# Patient Record
Sex: FEMALE | Race: WHITE | Employment: FULL TIME | URBAN - METROPOLITAN AREA
[De-identification: names, ages, dates, MRNs, and addresses within clinical notes are randomized per-mention and may not be internally consistent; named-entity substitution may affect disease eponyms.]

---

## 2017-07-18 ENCOUNTER — TELEPHONE (OUTPATIENT)
Dept: ENDOCRINOLOGY | Age: 27
End: 2017-07-18

## 2017-07-18 DIAGNOSIS — E10.69 TYPE I DIABETES MELLITUS WITH HYPEROSMOLAR COMA (HCC): ICD-10-CM

## 2017-07-18 DIAGNOSIS — E10.65 TYPE I DIABETES MELLITUS WITH HYPEROSMOLAR COMA (HCC): ICD-10-CM

## 2017-07-18 DIAGNOSIS — E87.0 TYPE I DIABETES MELLITUS WITH HYPEROSMOLAR COMA (HCC): ICD-10-CM

## 2017-07-18 NOTE — TELEPHONE ENCOUNTER
Patient was hoping you can call in her insulin to [facility] since her appointment is scheduled for 10/2017.   Thank you

## 2017-07-19 RX ORDER — INSULIN ASPART 100 [IU]/ML
INJECTION, SOLUTION INTRAVENOUS; SUBCUTANEOUS
Qty: 9 VIAL | Refills: 2 | Status: SHIPPED | OUTPATIENT
Start: 2017-07-19 | End: 2017-10-02 | Stop reason: SDUPTHER

## 2017-10-02 DIAGNOSIS — E10.65 TYPE I DIABETES MELLITUS WITH HYPEROSMOLAR COMA (HCC): ICD-10-CM

## 2017-10-02 DIAGNOSIS — E10.69 TYPE I DIABETES MELLITUS WITH HYPEROSMOLAR COMA (HCC): ICD-10-CM

## 2017-10-02 DIAGNOSIS — E87.0 TYPE I DIABETES MELLITUS WITH HYPEROSMOLAR COMA (HCC): ICD-10-CM

## 2017-10-02 RX ORDER — INSULIN ASPART 100 [IU]/ML
INJECTION, SOLUTION INTRAVENOUS; SUBCUTANEOUS
Qty: 9 VIAL | Refills: 0 | Status: SHIPPED | OUTPATIENT
Start: 2017-10-02 | End: 2017-10-10 | Stop reason: ALTCHOICE

## 2017-10-04 DIAGNOSIS — E10.65 HYPERGLYCEMIA DUE TO TYPE 1 DIABETES MELLITUS (HCC): Primary | ICD-10-CM

## 2017-10-04 RX ORDER — INSULIN LISPRO 100 [IU]/ML
INJECTION, SOLUTION INTRAVENOUS; SUBCUTANEOUS
Qty: 10 ML | Refills: 0 | Status: SHIPPED | OUTPATIENT
Start: 2017-10-04 | End: 2018-08-27 | Stop reason: ALTCHOICE

## 2017-10-10 ENCOUNTER — OFFICE VISIT (OUTPATIENT)
Dept: ENDOCRINOLOGY | Age: 27
End: 2017-10-10

## 2017-10-10 VITALS
DIASTOLIC BLOOD PRESSURE: 76 MMHG | TEMPERATURE: 98.4 F | WEIGHT: 139.3 LBS | OXYGEN SATURATION: 97 % | HEART RATE: 77 BPM | BODY MASS INDEX: 24.68 KG/M2 | RESPIRATION RATE: 18 BRPM | SYSTOLIC BLOOD PRESSURE: 112 MMHG | HEIGHT: 63 IN

## 2017-10-10 DIAGNOSIS — E16.2 HYPOGLYCEMIA: ICD-10-CM

## 2017-10-10 DIAGNOSIS — Z46.81 INSULIN PUMP TITRATION: ICD-10-CM

## 2017-10-10 DIAGNOSIS — E10.65 HYPERGLYCEMIA DUE TO TYPE 1 DIABETES MELLITUS (HCC): Primary | ICD-10-CM

## 2017-10-10 LAB
GLUCOSE POC: 360 MG/DL
HBA1C MFR BLD HPLC: 9.6 %

## 2017-10-10 RX ORDER — INSULIN ASPART 100 [IU]/ML
INJECTION, SOLUTION INTRAVENOUS; SUBCUTANEOUS
Qty: 2 VIAL | Refills: 0 | Status: SHIPPED | COMMUNITY
Start: 2017-10-10 | End: 2018-08-27 | Stop reason: ALTCHOICE

## 2017-10-10 NOTE — PROGRESS NOTES
HISTORY OF PRESENT ILLNESS     Fredi Kay is a 24 y.o. female. HPI   Patient is here for follow up for Type 1 diabetes mellitus after sept 2016       She could not f/u with me because of lack of insurance     She is requesting change to humalog first   She is running out of insulin     She is non-compliant with bolusing and checking sugars   Trying to work 2 jobs      She is on the pump medtronic   Has not used bolus doses at all     Log has no recordings   Does not use bolus or carbs   Had no low sugars either     She is no more looking stubborn and she understands the importance of glycemic control but not making any effort at all         Review of Systems   Constitutional: lost appetite    HENT: Negative. Eyes: Negative for pain and redness. Respiratory: Negative. Cardiovascular: Negative for chest pain, palpitations and leg swelling. Gastrointestinal: Negative. Negative for constipation. Genitourinary: Negative. Musculoskeletal: Negative for myalgias. Skin: Negative. Neurological: Negative. Endo/Heme/Allergies: Negative. Psychiatric/Behavioral: Negative for depression and memory loss. The patient does not have insomnia. Physical Exam   Constitutional: She is oriented to person, place, and time. She appears well-developed and well-nourished. HENT:   Head: Normocephalic. Eyes: Conjunctivae and extraocular motions are normal. Pupils are equal, round, and reactive to light. Neck: Normal range of motion. Neck supple. No JVD present. No tracheal deviation present. No thyromegaly present. Cardiovascular: Normal rate, regular rhythm and normal heart sounds. No murmur heard. Pulmonary/Chest: Breath sounds normal.   Abdominal: Soft. Bowel sounds are normal.   Musculoskeletal: Normal range of motion. Lymphadenopathy:   She has no cervical adenopathy. Neurological: She is alert and oriented to person, place, and time. She has normal reflexes. Skin: Skin is warm.  A small 8 mm size fleshy raised nodule on skin  Psychiatric: She has a normal mood and affect. ASSESSMENT and PLAN       1. Type 1DM, poorly controlled , on insulin pump :  A1C  Is  9.6 %    From today by finger stick compared    9.8 %    From   2016  Compared to  9.9 %   From [facility]   10.1 %     From 2015 compared to   9.1 %   From oct 2014 compared to    9.6 % from 2014 compared to  8.4 %  From 2013   Compared to 8.8 % from aug 2013 comapred to 9.3 % from 2013 ; And was over 11 % From 2012 compared to 9.5 % today 2013       Uncontrolled for the past 22 years   No complications set in yet gladly       She has no checks done and she has not been changing the site often either   She is requiring 40 units a day ( only basals) atleast   Changing to humalog  By formulary , but by  Her deductible regular insulin is affordable    no boluses are being used      She has  brought  meter or log and she has not checked her sugars at all  She smiles it off when insisted on control   She does maintain low carb diet and she is able to manage     TO key in carbs and use bolus wizard  Recommending cgms, she refused  For cost reasons   She cannot afford newer versions on insulin pump either     Again, she is counseled about glycemic control importance as she can face complications sooner as she is diagnosed with Type 1 as a baby - 18 months   3000 $ deductible   Strictly advised on using bolus wizard for every food item. Patient is educated about the importance of glycemic control to prevent progression of complications. Patient is advised about checking blood sugars 3 times a day at least and maintaining log book. She is told to go for precision xtra machine and test strips   She has contour next strips, which she has never used them and must  Have got        2. hypoglycemia : Prescribed Glucagon.      3. No microalb noted in urine, BP well controlled   Discussed starting on lisinopril 2.5 mg a day for renal protective action       Had an eye check up in May 2016 ,  and had no diabetes per pt , referred her to Dr. Clara Lugo     Thyroid screen before next visit     > 50 % of time is spent on counseling

## 2017-10-10 NOTE — PROGRESS NOTES
Chief Complaint   Patient presents with    Diabetes     1. Have you been to the ER, urgent care clinic since your last visit? Hospitalized since your last visit? No    2. Have you seen or consulted any other health care providers outside of the [address] [place] [address] since your last visit? Include any pap smears or colon screening.  No     Pt do not have meter    No foot exam  No eye exam    Wt Readings from Last 3 Encounters:   10/10/17 139 lb 4.8 oz (63.2 kg)   09/19/16 136 lb (61.7 kg)   02/09/16 138 lb (62.6 kg)     Temp Readings from Last 3 Encounters:   10/10/17 98.4 °F (36.9 °C) (Oral)   09/19/16 97.3 °F (36.3 °C) (Oral)   02/09/16 (!) 100.6 °F (38.1 °C) (Oral)     BP Readings from Last 3 Encounters:   10/10/17 112/76   09/19/16 117/76   02/09/16 113/84     Pulse Readings from Last 3 Encounters:   10/10/17 77   09/19/16 82   02/09/16 (!) 105

## 2017-10-10 NOTE — MR AVS SNAPSHOT
Visit Information Date & Time Provider Department Dept. Phone Encounter #  
 10/10/2017  3:30 PM Lashaun Edgar MD [facility] [phone] [id number] Upcoming Health Maintenance Date Due  
 LIPID PANEL Q1 1992 FOOT EXAM Q1 3/4/2002 EYE EXAM RETINAL OR DILATED Q1 3/4/2002 HPV AGE 9Y-26Y (1 of 3 - Female 3 Dose Series) 3/4/2003 Pneumococcal 19-64 Medium Risk (1 of 1 - PPSV23) 3/4/2011 DTaP/Tdap/Td series (1 - Tdap) 3/4/2013 PAP AKA CERVICAL CYTOLOGY 3/4/2013 MICROALBUMIN Q1 4/29/2016 HEMOGLOBIN A1C Q6M 3/19/2017 INFLUENZA AGE 9 TO ADULT 8/1/2017 Allergies as of 10/10/2017  Review Complete On: 10/10/2017 By: Lashaun Edgar MD  
 No Known Allergies Current Immunizations  Never Reviewed No immunizations on file. Not reviewed this visit You Were Diagnosed With   
  
 Codes Comments [facility]gly[facility] due to [facility]    -  Primary ICD-10-CM: E10.65 ICD-9-CM: 250.01 Insulin pump titration     ICD-10-CM: Z46.81 
ICD-9-CM: V53.91 Hypoglycemia     ICD-10-CM: E16.2 ICD-9-CM: 251.2 Vitals BP Pulse Temp Resp Height(growth percentile) Weight(growth percentile) 112/76 (BP 1 Location: Right arm, BP Patient Position: Sitting) 77 98.4 °F (36.9 °C) (Oral) 18 5' 3\" (1.6 m) 139 lb 4.8 oz (63.2 kg) LMP SpO2 BMI OB Status Smoking Status 09/22/2017 97% 24.68 kg/m2 Having regular periods Never Smoker BMI and BSA Data Body Mass Index Body Surface Area  
 24.68 kg/m 2 1.68 m 2 Preferred Pharmacy Pharmacy Name Phone [address], [address] [place] [address] [place] [phone] Your Updated Medication List  
  
   
This list is accurate as of: 10/10/17  4:37 PM.  Always use your most recent med list.  
  
  
  
  
 Blood-Glucose Meter monitoring kit Commonly known as:  FREESTYLE LITE METER To test daily Dx Code 250.00 glucose blood VI test strips strip Commonly known as:  FREESTYLE LITE STRIPS Test up to 3 times a day Dx Code E10.65  
  
 insulin lispro 100 unit/mL injection Commonly known as:  HUMALOG On insulin pump, requiring 70 units per day Stop Novolog  
  
 insulin regular 100 unit/mL injection Commonly known as:  NOVOLIN R, HUMULIN R  
60 units / day in pump Lancets Misc Commonly known as:  FREESTYLE LANCETS Test up to 3 times daily. Dx code C13.89 Prescriptions Printed Refills  
 insulin regular (NOVOLIN R, HUMULIN R) 100 unit/mL injection 6 Si units / day in pump Class: Print We Performed the Following AMB POC GLUCOSE BLOOD, BY GLUCOSE MONITORING DEVICE [99186 CPT(R)] AMB POC HEMOGLOBIN A1C [45512 CPT(R)] MICROALBUMIN, UR, RAND W/ MICROALBUMIN/CREA RATIO [id number] CPT(R)] Patient Instructions   
 
 
humalog   Preferred , needs at least  60 units a day   2 vials a month ,  
 
 
 
Reli-on  Insulin regular can be used instead of any quick acting insulins 
reli-on meter and strips Do not skip meals Do not eat in between meals Reduce carbs- pasta, rice, potatoes, bread Do not drink juices or sodas Donot eat peanut butter Do not eat sugar free cookies and cakes Do not eat peaches, grapes, pine apples, and oranges and tangerines Introducing [facility] & HEALTH SERVICES! Andreina Mccoy introduces [employer] patient portal. Now you can access parts of your medical record, email your doctor's office, and request medication refills online. 1. In your internet browser, go to [email or web address] 2. Click on the First Time User? Click Here link in the Sign In box. You will see the New Member Sign Up page. 3. Enter your [employer] Access Code exactly as it appears below. You will not need to use this code after youve completed the sign-up process.  If you do not sign up before the expiration date, you must request a new code. · [employer] Access Code: [email or web address] Expires: 1/8/2018  4:25 PM 
 
4. Enter the last four digits of your Social Security Number (xxxx) and Date of Birth (mm/dd/yyyy) as indicated and click Submit. You will be taken to the next sign-up page. 5. Create a [employer] ID. This will be your [employer] login ID and cannot be changed, so think of one that is secure and easy to remember. 6. Create a [employer] password. You can change your password at any time. 7. Enter your Password Reset Question and Answer. This can be used at a later time if you forget your password. 8. Enter your e-mail address. You will receive e-mail notification when new information is available in [address] 9. Click Sign Up. You can now view and download portions of your medical record. 10. Click the Download Summary menu link to download a portable copy of your medical information. If you have questions, please visit the Frequently Asked Questions section of the [employer] website. Remember, [employer] is NOT to be used for urgent needs. For medical emergencies, dial 911. Now available from your iPhone and Android! Please provide this summary of care documentation to your next provider. Your primary care clinician is listed as Clarita Andersen. If you have any questions after today's visit, please call [phone].

## 2017-10-10 NOTE — PATIENT INSTRUCTIONS
humalog   Preferred , needs at least  60 units a day   2 vials a month ,         Reli-on  Insulin regular can be used instead of any quick acting insulins  reli-on meter and strips                     Do not skip meals  Do not eat in between meals    Reduce carbs- pasta, rice, potatoes, bread   Do not drink juices or sodas  Donot eat peanut butter     Do not eat sugar free cookies and cakes   Do not eat peaches, grapes, pine apples, and oranges and tangerines

## 2017-10-11 LAB
ALBUMIN/CREAT UR: ABNORMAL MG/G CREAT (ref 0–30)
CREAT UR-MCNC: 41.1 MG/DL
MICROALBUMIN UR-MCNC: <3 UG/ML

## 2017-12-26 ENCOUNTER — TELEPHONE (OUTPATIENT)
Dept: ENDOCRINOLOGY | Age: 27
End: 2017-12-26

## 2018-08-27 RX ORDER — HUMAN INSULIN 100 [IU]/ML
INJECTION, SOLUTION SUBCUTANEOUS
Qty: 20 ML | Refills: 6 | Status: SHIPPED | OUTPATIENT
Start: 2018-08-27 | End: 2019-06-08 | Stop reason: SDUPTHER

## 2018-10-09 ENCOUNTER — OFFICE VISIT (OUTPATIENT)
Dept: ENDOCRINOLOGY | Age: 28
End: 2018-10-09

## 2018-10-09 VITALS
TEMPERATURE: 96.7 F | HEIGHT: 63 IN | WEIGHT: 134.5 LBS | OXYGEN SATURATION: 100 % | DIASTOLIC BLOOD PRESSURE: 77 MMHG | BODY MASS INDEX: 23.83 KG/M2 | SYSTOLIC BLOOD PRESSURE: 129 MMHG | RESPIRATION RATE: 18 BRPM | HEART RATE: 85 BPM

## 2018-10-09 DIAGNOSIS — Z46.81 INSULIN PUMP TITRATION: ICD-10-CM

## 2018-10-09 DIAGNOSIS — E10.65 HYPERGLYCEMIA DUE TO TYPE 1 DIABETES MELLITUS (HCC): Primary | ICD-10-CM

## 2018-10-09 DIAGNOSIS — Z79.4 ENCOUNTER FOR LONG-TERM (CURRENT) USE OF INSULIN (HCC): ICD-10-CM

## 2018-10-09 LAB
GLUCOSE POC: 379 MG/DL
HBA1C MFR BLD HPLC: 10.2 %

## 2018-10-09 NOTE — PROGRESS NOTES
HISTORY OF PRESENT ILLNESS Jostin Jeong is a 24 y.o. female. HPI Patient is here for follow up for Type 1 diabetes mellitus after oct 2017 She could not f/u with me because of lack of insurance She is requesting change to humalog first  
She is running out of insulin She is non-compliant with bolusing and checking sugars Trying to work 2 jobs She is on the pump medtronic Has not used bolus doses at all Log has no recordings Does not use bolus or carbs Had no low sugars either She is no more looking stubborn and she understands the importance of glycemic control but not making any effort at all Review of Systems Constitutional: lost appetite HENT: Negative. Eyes: Negative for pain and redness. Respiratory: Negative. Cardiovascular: Negative for chest pain, palpitations and leg swelling. Gastrointestinal: Negative. Negative for constipation. Genitourinary: Negative. Musculoskeletal: Negative for myalgias. Skin: Negative. Neurological: Negative. Endo/Heme/Allergies: Negative. Psychiatric/Behavioral: Negative for depression and memory loss. The patient does not have insomnia. Physical Exam  
Constitutional: She is oriented to person, place, and time. She appears well-developed and well-nourished. HENT:  
Head: Normocephalic. Eyes: Conjunctivae and extraocular motions are normal. Pupils are equal, round, and reactive to light. Neck: Normal range of motion. Neck supple. No JVD present. No tracheal deviation present. No thyromegaly present. Cardiovascular: Normal rate, regular rhythm and normal heart sounds. No murmur heard. Pulmonary/Chest: Breath sounds normal.  
Abdominal: Soft. Bowel sounds are normal.  
Musculoskeletal: Normal range of motion. Lymphadenopathy:  
She has no cervical adenopathy. Neurological: She is alert and oriented to person, place, and time. She has normal reflexes. Skin: Skin is warm. A small 8 mm size fleshy raised nodule on skin Psychiatric: She has a normal mood and affect. ASSESSMENT and PLAN 1. Type 1DM, poorly controlled , on insulin pump :  A1C  Is   10.2 %       From today by finger stick compared to    9.6 %    From today by finger stick compared    9.8 %    From   2016  Compared to  9.9 %   From [facility]   10.1 %     From 2015 compared to   9.1 %   From oct 2014 compared to 
  9.6 % from 2014 compared to  8.4 %  From 2013   Compared to 8.8 % from aug 2013 comapred to 9.3 % from 2013 ; And was over 11 % From 2012 compared to 9.5 % today 2013 Uncontrolled for the past 22 years No complications set in yet gladly She has no checks done and she has not been cARBing  either She is requiring 40 units a day ( only basals) atleast  
Changing to humalog  By formulary , but by  Her deductible regular insulin is affordable  
 no boluses are being used She has  brought  meter or log and she has not checked her sugars at all She smiles it off when insisted on control She does maintain low carb diet and she is able to manage TO key in carbs and use bolus wizard Recommending cgms, she refused  For cost reasons She cannot afford newer versions on insulin pump either Again, she is counseled about glycemic control importance as she can face complications sooner as she is diagnosed with Type 1 as a baby - 18 months 3000 $ deductible Strictly advised on using bolus wizard for every food item. Patient is educated about the importance of glycemic control to prevent progression of complications. Patient is advised about checking blood sugars 3 times a day at least and maintaining log book. She is told to go for precision xtra machine and test strips She has contour next strips, which she has never used them and must  Have got  2. hypoglycemia : Prescribed Glucagon. 3. No microalb noted in urine, BP well controlled Discussed starting on lisinopril 2.5 mg a day for renal protective action Had an eye check up in May 2016 ,  and had no diabetes per pt , referred her to Dr. Shanel Rodriguez Thyroid screen before next visit She has a huge deductible  And so any thing medical , unfortunately she has to think about deductible  Several times including eye visits  
 
 
> 50 % of time is spent on counseling Patient voiced understanding her plan of care

## 2018-10-09 NOTE — PATIENT INSTRUCTIONS
-------------------------------------------------------------------------------------------- Refills    -    please call your pharmacy and have them send us a refill request 
 
Results  -  allow up to a week for lab results to be processed and reviewed. Phone calls  -  Allow upto 24 hrs. for non-urgent calls to be retained Prior authorization - It may take up to 4 weeks to process, depending on your insurance Forms  -  FMLA, DMV, patient assistance, etc. will take up to 2 weeks to process Cancellations - please notify the office in advance if you cannot keep your appointment Samples  - will only be dispensed at visits as supply is limited If you are having a medical emergency call 911 
 
-------------------------------------------------------------------------------------------- 
 
 
humalog   Preferred , needs at least  60 units a day   2 vials a month ,  
 
 
 
Reli-on  Insulin regular can be used instead of any quick acting insulins like humalog ( pt is using novolin regular insulin ) 
reli-on meter and strips Do not skip meals Do not eat in between meals Reduce carbs- pasta, rice, potatoes, bread Do not drink juices or sodas Donot eat peanut butter Do not eat sugar free cookies and cakes Do not eat peaches, grapes, pine apples, and oranges and tangerines

## 2018-10-09 NOTE — PROGRESS NOTES
1. Have you been to the ER, urgent care clinic since your last visit? Hospitalized since your last visit? No 
 
2. Have you seen or consulted any other health care providers outside of the [address] [place] [address] since your last visit? Include any pap smears or colon screening. No  
 
Wt Readings from Last 3 Encounters:  
10/09/18 134 lb 8 oz (61 kg) 10/10/17 139 lb 4.8 oz (63.2 kg) 09/19/16 136 lb (61.7 kg) Temp Readings from Last 3 Encounters:  
10/09/18 96.7 °F (35.9 °C) (Oral) 10/10/17 98.4 °F (36.9 °C) (Oral) 09/19/16 97.3 °F (36.3 °C) (Oral) BP Readings from Last 3 Encounters:  
10/09/18 129/77  
10/10/17 112/76  
09/19/16 117/76 Pulse Readings from Last 3 Encounters:  
10/09/18 85  
10/10/17 77  
09/19/16 82 Lab Results Component Value Date/Time Hemoglobin A1c (POC) 9.6 10/10/2017 03:55 PM  
 Hemoglobin A1c, External 9.9 01/12/2016 Patient has pump today.  
Patient states she will schedule eye exam.

## 2018-10-09 NOTE — MR AVS SNAPSHOT
[address] [facility] [address] 
[phone] Patient: James Bradshaw MRN: [id number] JEISON:[birth year] Visit Information Date & Time Provider Department Dept. Phone Encounter #  
 10/9/2018  3:30 PM Shay Mckeon MD [facility] [phone] [id number] Follow-up Instructions Return in about 6 months (around 4/9/2019). Upcoming Health Maintenance Date Due  
 LIPID PANEL Q1 1992 FOOT EXAM Q1 3/4/2002 EYE EXAM RETINAL OR DILATED Q1 3/4/2002 HPV Age 9Y-34Y (1 of 3 - Female 3 Dose Series) 3/4/2003 Pneumococcal 19-64 Medium Risk (1 of 1 - PPSV23) 3/4/2011 DTaP/Tdap/Td series (1 - Tdap) 3/4/2013 PAP AKA CERVICAL CYTOLOGY 3/4/2013 HEMOGLOBIN A1C Q6M 4/10/2018 Influenza Age 5 to Adult 8/1/2018 MICROALBUMIN Q1 10/10/2018 Allergies as of 10/9/2018  Review Complete On: 10/9/2018 By: Shay Mckeon MD  
 No Known Allergies Current Immunizations  Never Reviewed No immunizations on file. Not reviewed this visit You Were Diagnosed With   
  
 Codes Comments Hyperglycemia due to type 1 diabetes mellitus ([facility]    -  Primary ICD-10-CM: E10.65 ICD-9-CM: 250.01 Encounter for long-term (current) use of insulin ([facility]     ICD-10-CM: Z79.4 ICD-9-CM: V58.67 Vitals BP Pulse Temp Resp Height(growth percentile) Weight(growth percentile) 129/77 (BP 1 Location: Right arm, BP Patient Position: Sitting) 85 96.7 °F (35.9 °C) (Oral) 18 5' 3\" (1.6 m) 134 lb 8 oz (61 kg) LMP SpO2 BMI OB Status Smoking Status 09/18/2018 100% 23.83 kg/m2 Having regular periods Never Smoker BMI and BSA Data Body Mass Index Body Surface Area  
 23.83 kg/m 2 1.65 m 2 [facility] [address] [facility] [address] Your Updated Medication List  
  
   
 This list is accurate as of 10/9/18  4:04 PM.  Always use your most recent med list.  
  
  
  
  
 Blood-Glucose Meter monitoring kit Commonly known as:  FREESTYLE LITE METER To test daily Dx Code 250.00  
  
 flash glucose scanning reader Misc Commonly known as:  FREESTYLE STACEY 10 DAY READER Use as directed  
  
 flash glucose sensor Kit Commonly known as:  [address] [place] [address] Once every 10 days  
  
 glucose blood VI test strips strip Commonly known as:  FREESTYLE LITE STRIPS Test up to 3 times a day Dx Code E10.65 Lancets Misc Commonly known as:  FREESTYLE LANCETS Test up to 3 times daily. Dx code J49.50 NovoLIN R Regular U-100 Insuln 100 unit/mL injection Generic drug:  insulin regular INJECT 60 UNITS ONCE DAILY IN  PUMP Prescriptions Printed Refills  
 flash glucose scanning reader (FREESTYLE STACEY 10 DAY READER) misc 0 Sig: Use as directed Class: Print  
 flash glucose sensor (FREESTYLE STACEY 10 DAY SENSOR) kit 6 Sig: Once every 10 days Class: Print We Performed the Following AMB POC GLUCOSE BLOOD, BY GLUCOSE MONITORING DEVICE [05873 CPT(R)] AMB POC HEMOGLOBIN A1C [48571 CPT(R)] METABOLIC PANEL, BASIC [07420 CPT(R)] MICROALBUMIN, UR, RAND W/ MICROALB/CREAT RATIO [id number] CPT(R)] REFERRAL TO OPHTHALMOLOGY [REF57 Custom] Comments: DDEE  
 TSH 3RD GENERATION [06628 CPT(R)] Follow-up Instructions Return in about 6 months (around 4/9/2019). Referral Information Referral ID Referred By Referred To  
  
 [id number] Yakelin Ralph Not Available Visits Status Start Date End Date 1 New Request 10/9/18 10/9/19 If your referral has a status of pending review or denied, additional information will be sent to support the outcome of this decision.   
  
Patient Instructions   
-------------------------------------------------------------------------------------------- 
 
 Refills    -    please call your pharmacy and have them send us a refill request 
 
Results  -  allow up to a week for lab results to be processed and reviewed. Phone calls  -  Allow upto 24 hrs. for non-urgent calls to be retained Prior authorization - It may take up to 4 weeks to process, depending on your insurance Forms  -  FMLA, DMV, patient assistance, etc. will take up to 2 weeks to process Cancellations - please notify the office in advance if you cannot keep your appointment Samples  - will only be dispensed at visits as supply is limited If you are having a medical emergency call 911 
 
-------------------------------------------------------------------------------------------- 
 
 
humalog   Preferred , needs at least  60 units a day   2 vials a month ,  
 
 
 
Reli-on  Insulin regular can be used instead of any quick acting insulins like humalog ( pt is using novolin regular insulin ) 
reli-on meter and strips Do not skip meals Do not eat in between meals Reduce carbs- pasta, rice, potatoes, bread Do not drink juices or sodas Donot eat peanut butter Do not eat sugar free cookies and cakes Do not eat peaches, grapes, pine apples, and oranges and tangerines Introducing [facility] & HEALTH SERVICES! Dear Stacey: 
Thank you for requesting a [employer] Web account. Our records indicate that you already have an active Possible Web account. You can access your account anytime at [email or web address] Did you know that you can access your hospital and ER discharge instructions at any time in [employer]? You can also review all of your test results from your hospital stay or ER visit. Additional Information If you have questions, please visit the Frequently Asked Questions section of the [employer] website at [email or web address] Remember, [employer] is NOT to be used for urgent needs. For medical emergencies, dial 911. Now available from your iPhone and Android! Please provide this summary of care documentation to your next provider. Your primary care clinician is listed as Annmarie Dang. If you have any questions after today's visit, please call [phone].

## 2018-10-10 LAB
ALBUMIN/CREAT UR: <7 MG/G CREAT (ref 0–30)
BUN SERPL-MCNC: 13 MG/DL (ref 6–20)
BUN/CREAT SERPL: 19 (ref 9–23)
CALCIUM SERPL-MCNC: 8.9 MG/DL (ref 8.7–10.2)
CHLORIDE SERPL-SCNC: 95 MMOL/L (ref 96–106)
CO2 SERPL-SCNC: 23 MMOL/L (ref 20–29)
CREAT SERPL-MCNC: 0.68 MG/DL (ref 0.57–1)
CREAT UR-MCNC: 42.6 MG/DL
GLUCOSE SERPL-MCNC: 395 MG/DL (ref 65–99)
MICROALBUMIN UR-MCNC: <3 UG/ML
POTASSIUM SERPL-SCNC: 4.9 MMOL/L (ref 3.5–5.2)
SODIUM SERPL-SCNC: 135 MMOL/L (ref 134–144)
TSH SERPL DL<=0.005 MIU/L-ACNC: 2.09 UIU/ML (ref 0.45–4.5)

## 2019-06-09 RX ORDER — HUMAN INSULIN 100 [IU]/ML
INJECTION, SOLUTION SUBCUTANEOUS
Qty: 20 ML | Refills: 4 | Status: SHIPPED | OUTPATIENT
Start: 2019-06-09 | End: 2020-07-21 | Stop reason: ALTCHOICE

## 2020-02-07 ENCOUNTER — IP HISTORICAL/CONVERTED ENCOUNTER (OUTPATIENT)
Dept: OTHER | Age: 30
End: 2020-02-07

## 2020-06-29 RX ORDER — INSULIN LISPRO 100 [IU]/ML
INJECTION, SOLUTION INTRAVENOUS; SUBCUTANEOUS
COMMUNITY
End: 2020-07-21 | Stop reason: SDUPTHER

## 2020-07-21 ENCOUNTER — OFFICE VISIT (OUTPATIENT)
Dept: ENDOCRINOLOGY | Age: 30
End: 2020-07-21
Payer: COMMERCIAL

## 2020-07-21 VITALS
HEIGHT: 65 IN | BODY MASS INDEX: 26.79 KG/M2 | SYSTOLIC BLOOD PRESSURE: 118 MMHG | TEMPERATURE: 99.1 F | WEIGHT: 160.8 LBS | HEART RATE: 97 BPM | DIASTOLIC BLOOD PRESSURE: 77 MMHG | RESPIRATION RATE: 97 BRPM

## 2020-07-21 DIAGNOSIS — E10.65 UNCONTROLLED TYPE 1 DIABETES MELLITUS WITH HYPERGLYCEMIA (HCC): Primary | ICD-10-CM

## 2020-07-21 DIAGNOSIS — E11.9 CONTROLLED TYPE 2 DIABETES MELLITUS WITHOUT COMPLICATION, UNSPECIFIED WHETHER LONG TERM INSULIN USE (HCC): ICD-10-CM

## 2020-07-21 LAB
BILIRUB UR QL STRIP: NEGATIVE
GLUCOSE POC: 377 MG/DL
GLUCOSE UR-MCNC: ABNORMAL MG/DL
KETONES P FAST UR STRIP-MCNC: ABNORMAL MG/DL
PH UR STRIP: 6 [PH] (ref 4.6–8)
PROT UR QL STRIP: NEGATIVE
SP GR UR STRIP: 10.15 (ref 1–1.03)
UA UROBILINOGEN AMB POC: ABNORMAL (ref 0.2–1)
URINALYSIS CLARITY POC: CLEAR
URINALYSIS COLOR POC: YELLOW
URINE BLOOD POC: NEGATIVE
URINE LEUKOCYTES POC: NEGATIVE
URINE NITRITES POC: NEGATIVE

## 2020-07-21 PROCEDURE — 99214 OFFICE O/P EST MOD 30 MIN: CPT | Performed by: INTERNAL MEDICINE

## 2020-07-21 PROCEDURE — 81003 URINALYSIS AUTO W/O SCOPE: CPT | Performed by: INTERNAL MEDICINE

## 2020-07-21 PROCEDURE — 82962 GLUCOSE BLOOD TEST: CPT | Performed by: INTERNAL MEDICINE

## 2020-07-21 RX ORDER — INSULIN LISPRO 100 [IU]/ML
INJECTION, SOLUTION INTRAVENOUS; SUBCUTANEOUS
Qty: 4 VIAL | Refills: 5
Start: 2020-07-21 | End: 2020-09-04

## 2020-07-21 NOTE — PROGRESS NOTES
History and Physical    Patient: Marcella Wyman MRN: [id number]  SSN: [id number]    YOB: 1990  Age: 31 y.o. Sex: female      Subjective:      Marcella Wyman is a 31 y.o. female no significant past medical history is here for follow-up of type 1 diabetes mellitus. She is in primary care of Dr. Edouard James. Patient was previously seeing endocrinologist Dr. Nisa Pereira. She is switching to me for second opinion. She was diagnosed with diabetes when she was 21 months old. She was initially on injections. Started using insulin pump in 2009. Patient is trying to bolus more frequently for carbohydrate intake. She denies eating any carbohydrates without bolusing. However, she is noticing that her blood glucose is staying high and it does not come down, except if she stops eating and just drinks a lot of water. She is interested in switching her insulin pump to tandem T slim. She denies any stomach pain, nausea, vomiting. We had intensified her carb ratio and sensitivity factor at the last visit but it did not make much difference. She has started using smart phone applications for carbohydrate counting, which are helping. Also after the last visit we tried to switch her from Humalog to NovoLog, prior authorization was approved but it was still very expensive, so she had to continue with Humalog. She also found out that her insulin pump will go out warranty in July.     Glucometer reading: Patient did not bring glucometer today    Updated diabetes history:  · Diagnosis: 21 months old    · Current treatment: pump since 2009    · Past treatment: injections novolog, lantus, humalog     · Glucose checks: yes 200-400s    · Hyperglycemia: yes    · Hypoglycemia: no    · Meals per day: 3, carb counting, 3 meals + a snacks at bedtime,     · Exercise: walks     · DM related hospitalizations: no,         Complications of DM:    · CAD: no    · CVA: no    · PVD: no    · Amputations: no     · Retinopathy: no; last exam was 8/2019    · Gastropathy: no    · Nephropathy: no    · Neuropathy: no        Medications:    · Statin: NA    · ACE-I: NA    · ASA: NA        · Diabetes education: long time back   Pump     Pump : [employer] MiniMed 530 G    Insulin type Humalog    Insulin Time: 3 hours    Insulin Pump Settings    Basal rate:  Midnight to 2 AM: 1.8  2 AM to 9 AM: 1.4  9 AM to midnight: 1.8    Carb ratio 1: 6    Insulin sensitivity: 30    Blood glucose target: 100150    Past Medical History:   Diagnosis Date    Type 1 diabetes mellitus, uncontrolled ([address]      No past surgical history on file. Family History   Problem Relation Age of Onset    Diabetes Mother     Diabetes Maternal Grandmother     Diabetes Maternal Grandfather      Social History     Tobacco Use    Smoking status: Never Smoker    Smokeless tobacco: Never Used   Substance Use Topics    Alcohol use: Yes     Comment: occ      Prior to Admission medications    Medication Sig Start Date End Date Taking? Authorizing Provider   insulin lispro (HumaLOG U-100 Insulin) 100 unit/mL injection by SubCUTAneous route. Provider, Historical   flash glucose scanning reader (FREESTYLE STACEY 10 DAY READER) misc Use as directed 10/9/18   Jaymie Ariza MD   flash glucose sensor (FREESTYLE STACEY 10 DAY SENSOR) kit Once every 10 days 10/9/18   Jaymie Ariza MD   glucose blood VI test strips (FREESTYLE LITE STRIPS) strip Test up to 3 times a day Dx Code E10.65 2/9/16   Jaymie Ariza MD   Lancets (FREESTYLE LANCETS) misc Test up to 3 times daily.  Dx code E10.65 2/9/16   Jaymie Ariza MD   Blood-Glucose Meter (FREESTYLE LITE METER) monitoring kit To test daily Dx Code 250.00 8/6/15   Jaymie Ariza MD        No Known Allergies    Review of Systems:  ROS    A comprehensive review of systems was preformed and it is negative except mentioned in HPI    Objective:     Vitals:    07/21/20 1525   BP: 118/77   Pulse: 97   Resp: (!) 97   Temp: 99.1 °F (37.3 °C)   Weight: 160 lb 12.8 oz (72.9 kg)   Height: 5' 5\" (1.651 m)        Physical Exam:    Physical Exam  Vitals signs and nursing note reviewed. Constitutional:       Appearance: Normal appearance. HENT:      Head: Normocephalic and atraumatic. Eyes:      Extraocular Movements: Extraocular movements intact. Cardiovascular:      Rate and Rhythm: Normal rate and regular rhythm. Pulmonary:      Effort: Pulmonary effort is normal.      Breath sounds: Normal breath sounds. Neurological:      General: No focal deficit present. Mental Status: She is alert. Psychiatric:         Mood and Affect: Mood normal.         Behavior: Behavior normal.         Thought Content: Thought content normal.         Judgment: Judgment normal.          Labs and Imaging:    Last 3 Recorded Weights in this Encounter    07/21/20 1525   Weight: 160 lb 12.8 oz (72.9 kg)        Lab Results   Component Value Date/Time    Hemoglobin A1c, External 9.9 01/12/2016    Hemoglobin A1c, External 9.6 04/29/2015 08:26 AM        Assessment:     Patient Active Problem List   Diagnosis Code    Type I (juvenile type) diabetes mellitus without mention of complication, uncontrolled E10.65    Hypoglycemia E16.2    Hyperglycemia due to type 1 diabetes mellitus ([facility] [address] E10.65    Encounter for long-term (current) use of insulin ([facility]) Z79.4    Insulin pump titration Z46.81           Plan:     Type 1 diabetes mellitus uncontrolled:    Hemoglobin A1c was 12.1% on 2-, 12% on 6-9-2020. Fingerstick blood glucose is 377 mg/dL in my office today. Urine is showing 40 ketones. Up to date with diabetes related annual labs: yes  Up to date with diabetic eye exam: yes  Plan:  Insulin pump could not be downloaded today because of technical issues. Explained to patient that it is possible that her pump is not working properly and that is why her glucose is not coming down appropriately although we had increased her settings.   Since she is interested in trying tandem T slim, I will send her information to the company representative, so that she can start the process. For now I will increase her settings as follows:  Basal rate:  Midnight to 2 AM: 1.8 --> 2  2 AM to 9 AM: 1.4 --> 1.8  9 AM to midnight: 1.8 --> 2    Carb ratio 1: 6 --> 4    Insulin sensitivity: 30 --> 20    Advised patient to drink a lot of water, check her blood glucose frequently and give herself a correction bolus. Patient gave herself a correction bolus through insulin pump and dry office. Patient is aware that if she starts having stomach pain, nausea, vomiting, she needs to go to the ER to get evaluated for DKA. Time spent with patient: 25 minutes with more than 50% time spent face-to-face in counseling and patient education.     Orders Placed This Encounter    AMB POC GLUCOSE BLOOD, BY GLUCOSE MONITORING DEVICE        Signed By: Stephen Valdovinos MD     July 21, 2020      Return to clinic 4-months

## 2020-08-21 VITALS
OXYGEN SATURATION: 99 % | HEIGHT: 65 IN | BODY MASS INDEX: 27.16 KG/M2 | WEIGHT: 163 LBS | SYSTOLIC BLOOD PRESSURE: 122 MMHG | TEMPERATURE: 97.8 F | DIASTOLIC BLOOD PRESSURE: 93 MMHG | HEART RATE: 86 BPM

## 2020-08-21 RX ORDER — INSULIN ASPART 100 [IU]/ML
INJECTION, SOLUTION INTRAVENOUS; SUBCUTANEOUS
COMMUNITY
End: 2020-09-04 | Stop reason: ALTCHOICE

## 2020-11-24 ENCOUNTER — OFFICE VISIT (OUTPATIENT)
Dept: ENDOCRINOLOGY | Age: 30
End: 2020-11-24
Payer: COMMERCIAL

## 2020-11-24 VITALS
DIASTOLIC BLOOD PRESSURE: 93 MMHG | TEMPERATURE: 97.6 F | OXYGEN SATURATION: 97 % | WEIGHT: 159.7 LBS | HEIGHT: 65 IN | HEART RATE: 82 BPM | SYSTOLIC BLOOD PRESSURE: 135 MMHG | BODY MASS INDEX: 26.61 KG/M2

## 2020-11-24 DIAGNOSIS — E10.65 UNCONTROLLED TYPE 1 DIABETES MELLITUS WITH HYPERGLYCEMIA (HCC): Primary | ICD-10-CM

## 2020-11-24 PROCEDURE — 99214 OFFICE O/P EST MOD 30 MIN: CPT | Performed by: INTERNAL MEDICINE

## 2020-11-24 RX ORDER — INSULIN LISPRO 100 [IU]/ML
INJECTION, SOLUTION INTRAVENOUS; SUBCUTANEOUS
Qty: 30 ML | Refills: 5
Start: 2020-11-24 | End: 2021-02-24 | Stop reason: SDUPTHER

## 2020-11-24 NOTE — PROGRESS NOTES
History and Physical    Patient: Woody Weeks MRN: [id number]  SSN: [id number]    YOB: 1990  Age: 31 y.o. Sex: female      Subjective:      Woody Weeks is a 31 y.o. female no significant past medical history is here for follow-up of type 1 diabetes mellitus. She is in primary care of Dr. Tea Kowalski. Patient was previously seeing endocrinologist Dr. Maureen Nur. She is switching to me for second opinion. She was diagnosed with diabetes when she was 21 months old. She was initially on injections. Started using insulin pump in 2009. Patient started using tandem T slim insulin pump with control IQ system in September 2020. However she was having a lot of fluctuations in blood glucose with control IQ system, so she stopped using it. However she continues to use Dexcom. She is reporting random hypoglycemia, which mainly occurs right before she is about to eat. She is trying to do better in terms of entering all her carbohydrate intake. Glucometer reading: Insulin pump was downloaded and reviewed with patient. This will be scanned into patient's chart.     Updated diabetes history:  · Diagnosis: 21 months old    · Current treatment: pump since 2009    · Past treatment: injections novolog, lantus, humalog     · Glucose checks: yes 200-400s    · Hyperglycemia: yes    · Hypoglycemia: no    · Meals per day: 3, carb counting, 3 meals + a snacks at bedtime,     · Exercise: walks     · DM related hospitalizations: no,         Complications of DM:    · CAD: no    · CVA: no    · PVD: no    · Amputations: no     · Retinopathy: no; last exam was 8/2019    · Gastropathy: no    · Nephropathy: no    · Neuropathy: no        Medications:    · Statin: NA    · ACE-I: NA    · ASA: NA        · Diabetes education: long time back   Pump     Pump : Tandem T slim    Insulin type Humalog    Insulin Time: 5 hours    Insulin Pump Settings    Basal rate:  Midnight to 2 AM: 2  2 AM to 9 AM: 1. 8  9 AM to midnight: 2    Carb ratio 1: 4    Insulin sensitivity: 20    Blood glucose target: 110    Past Medical History:   Diagnosis Date    Type 1 diabetes mellitus, uncontrolled ([address]      Past Surgical History:   Procedure Laterality Date    HX CYST REMOVAL N/A 02/09/2020    Patient had a cyst removal 02/09/2020    HX WISDOM TEETH EXTRACTION        Family History   Problem Relation Age of Onset    Diabetes Mother     Hypertension Mother     Diabetes Maternal Grandmother     Diabetes Maternal Grandfather     Diabetes Maternal Uncle     Hypertension Paternal Grandfather      Social History     Tobacco Use    Smoking status: Never Smoker    Smokeless tobacco: Never Used   Substance Use Topics    Alcohol use: Yes     Comment: occ      Prior to Admission medications    Medication Sig Start Date End Date Taking? Authorizing Provider   insulin lispro (HumaLOG U-100 Insulin) 100 unit/mL injection USE IN INSULIN PUMP AS DIRECTED -- MAX DAILY DOSE = 80 UNITS 11/24/20  Yes Carole Hannah MD   flash glucose scanning reader (FREESTYLE STACEY 10 DAY READER) misc Use as directed 10/9/18  Yes Taiwo Dobson MD   flash glucose sensor (FREESTYLE STACEY 10 DAY SENSOR) kit Once every 10 days 10/9/18  Yes Taiwo Dobson MD   glucose blood VI test strips (FREESTYLE LITE STRIPS) strip Test up to 3 times a day Dx Code E10.65 2/9/16  Yes Taiwo Dobson MD   Lancets (FREESTYLE LANCETS) misc Test up to 3 times daily.  Dx code E10.65 2/9/16  Yes Taiwo Dobson MD   Blood-Glucose Meter (FREESTYLE LITE METER) monitoring kit To test daily Dx Code 250.00 8/6/15  Yes Taiwo Dobson MD        No Known Allergies    Review of Systems:  ROS    A comprehensive review of systems was preformed and it is negative except mentioned in HPI    Objective:     Vitals:    11/24/20 1617   BP: (!) 135/93   Pulse: 82   Temp: 97.6 °F (36.4 °C)   TempSrc: Temporal   SpO2: 97%   Weight: 159 lb 11.2 oz (72.4 kg)   Height: 5' 5\" (1.651 m) Physical Exam:    Physical Exam  Vitals signs and nursing note reviewed. Constitutional:       Appearance: Normal appearance. HENT:      Head: Normocephalic and atraumatic. Eyes:      Extraocular Movements: Extraocular movements intact. Cardiovascular:      Rate and Rhythm: Normal rate and regular rhythm. Pulmonary:      Effort: Pulmonary effort is normal.      Breath sounds: Normal breath sounds. Neurological:      General: No focal deficit present. Mental Status: She is alert. Psychiatric:         Mood and Affect: Mood normal.         Behavior: Behavior normal.         Thought Content: Thought content normal.         Judgment: Judgment normal.          Labs and Imaging:    Last 3 Recorded Weights in this Encounter    11/24/20 1617   Weight: 159 lb 11.2 oz (72.4 kg)        Lab Results   Component Value Date/Time    Hemoglobin A1c, External 9.9 01/12/2016    Hemoglobin A1c, External 9.6 04/29/2015 08:26 AM        Assessment:     Patient Active Problem List   Diagnosis Code    Type I (juvenile type) diabetes mellitus without mention of complication, uncontrolled E10.65    Hypoglycemia E16.2    Hyperglycemia due to type 1 diabetes mellitus ([facility] [address] E10.65    Encounter for long-term (current) use of insulin ([facility]) Z79.4    Insulin pump titration Z46.81           Plan:     Type 1 diabetes mellitus uncontrolled:  Hemoglobin A1c was 12.1% on 2-, 12% on 6-9-2020, 0.4% today  Fingerstick blood glucose is 99 mg/dL in my office today. Up to date with diabetes related annual labs: yes  Up to date with diabetic eye exam: yes  Plan:  Reviewed patient's insulin pump download. Patient is having hypoglycemia overnight, overall there is no clear trend in her glucose readings. Reinforced to patient importance of entering all her carbohydrates and bolusing for it. I will decrease her basal rate as follows. I am not making any changes in her carb ratio or sensitivity factor.   Basal rate:  Midnight to 2 AM: 2 --> 1.8  2 AM to 9 AM: 1.8 --> 1.4  9 AM to midnight: 2 --> 1.8    Time spent with patient: 25 minutes with more than 50% time spent face-to-face in counseling and patient education.     Orders Placed This Encounter    LIPID PANEL     Standing Status:   Future     Standing Expiration Date:   1/51/4243    METABOLIC PANEL, COMPREHENSIVE     Standing Status:   Future     Standing Expiration Date:   5/24/2021    MICROALBUMIN, UR, RAND W/ MICROALB/CREAT RATIO     Standing Status:   Future     Standing Expiration Date:   5/24/2021    TSH RFX ON ABNORMAL TO FREE T4     Standing Status:   Future     Standing Expiration Date:   5/24/2021    insulin lispro (HumaLOG U-100 Insulin) 100 unit/mL injection     Sig: USE IN INSULIN PUMP AS DIRECTED -- MAX DAILY DOSE = 80 UNITS     Dispense:  30 mL     Refill:  5        Signed By: Maranda Gaxiola MD     November 25, 2020      Return to clinic 3 months

## 2021-01-22 ENCOUNTER — DOCUMENTATION ONLY (OUTPATIENT)
Dept: ENDOCRINOLOGY | Age: 31
End: 2021-01-22

## 2021-02-19 LAB
ALBUMIN SERPL-MCNC: 4.6 G/DL (ref 3.9–5)
ALBUMIN/CREAT UR: 10 MG/G CREAT (ref 0–29)
ALBUMIN/GLOB SERPL: 1.8 {RATIO} (ref 1.2–2.2)
ALP SERPL-CCNC: 91 IU/L (ref 39–117)
ALT SERPL-CCNC: 12 IU/L (ref 0–32)
AST SERPL-CCNC: 16 IU/L (ref 0–40)
BILIRUB SERPL-MCNC: 0.2 MG/DL (ref 0–1.2)
BUN SERPL-MCNC: 12 MG/DL (ref 6–20)
BUN/CREAT SERPL: 17 (ref 9–23)
CALCIUM SERPL-MCNC: 9.1 MG/DL (ref 8.7–10.2)
CHLORIDE SERPL-SCNC: 105 MMOL/L (ref 96–106)
CHOLEST SERPL-MCNC: 156 MG/DL (ref 100–199)
CO2 SERPL-SCNC: 23 MMOL/L (ref 20–29)
CREAT SERPL-MCNC: 0.69 MG/DL (ref 0.57–1)
CREAT UR-MCNC: 160.2 MG/DL
GLOBULIN SER CALC-MCNC: 2.5 G/DL (ref 1.5–4.5)
GLUCOSE SERPL-MCNC: 62 MG/DL (ref 65–99)
HDLC SERPL-MCNC: 56 MG/DL
LDLC SERPL CALC-MCNC: 86 MG/DL (ref 0–99)
MICROALBUMIN UR-MCNC: 16.4 UG/ML
POTASSIUM SERPL-SCNC: 4.4 MMOL/L (ref 3.5–5.2)
PROT SERPL-MCNC: 7.1 G/DL (ref 6–8.5)
SODIUM SERPL-SCNC: 142 MMOL/L (ref 134–144)
TRIGL SERPL-MCNC: 69 MG/DL (ref 0–149)
TSH SERPL DL<=0.005 MIU/L-ACNC: 2.13 UIU/ML (ref 0.45–4.5)
VLDLC SERPL CALC-MCNC: 14 MG/DL (ref 5–40)

## 2021-02-24 ENCOUNTER — OFFICE VISIT (OUTPATIENT)
Dept: ENDOCRINOLOGY | Age: 31
End: 2021-02-24
Payer: COMMERCIAL

## 2021-02-24 VITALS
OXYGEN SATURATION: 98 % | DIASTOLIC BLOOD PRESSURE: 86 MMHG | SYSTOLIC BLOOD PRESSURE: 140 MMHG | BODY MASS INDEX: 26.34 KG/M2 | HEART RATE: 105 BPM | HEIGHT: 65 IN | WEIGHT: 158.1 LBS | TEMPERATURE: 98.6 F

## 2021-02-24 DIAGNOSIS — E10.65 UNCONTROLLED TYPE 1 DIABETES MELLITUS WITH HYPERGLYCEMIA (HCC): ICD-10-CM

## 2021-02-24 DIAGNOSIS — E10.65 UNCONTROLLED TYPE 1 DIABETES MELLITUS WITH HYPERGLYCEMIA (HCC): Primary | ICD-10-CM

## 2021-02-24 LAB
GLUCOSE POC: 178 MG/DL
HBA1C MFR BLD HPLC: 7.2 %

## 2021-02-24 PROCEDURE — 82962 GLUCOSE BLOOD TEST: CPT | Performed by: INTERNAL MEDICINE

## 2021-02-24 PROCEDURE — 99214 OFFICE O/P EST MOD 30 MIN: CPT | Performed by: INTERNAL MEDICINE

## 2021-02-24 PROCEDURE — 3051F HG A1C>EQUAL 7.0%<8.0%: CPT | Performed by: INTERNAL MEDICINE

## 2021-02-24 PROCEDURE — 83036 HEMOGLOBIN GLYCOSYLATED A1C: CPT | Performed by: INTERNAL MEDICINE

## 2021-02-24 RX ORDER — INSULIN LISPRO 100 [IU]/ML
INJECTION, SOLUTION INTRAVENOUS; SUBCUTANEOUS
Qty: 30 ML | Refills: 5
Start: 2021-02-24 | End: 2021-03-04 | Stop reason: SDUPTHER

## 2021-02-24 NOTE — LETTER
2/24/2021 Patient: Judy Freedman YOB: 1990 Date of Visit: 2/24/2021 Delores Rivas MD 
[address] [address] [address] Fax: [phone] Dear Delores Rivas MD, Thank you for referring Ms. Flavia Walker to [address] [place] [address] for evaluation. My notes for this consultation are attached. If you have questions, please do not hesitate to call me. I look forward to following your patient along with you. Sincerely, Sofia Kimbrough MD

## 2021-02-24 NOTE — PROGRESS NOTES
History and Physical    Patient: Nelida Halsted MRN: [id number]  SSN: [id number]    YOB: 1990  Age: 31 y.o. Sex: female      Subjective:      Nelida Halsted is a 31 y.o. female no significant past medical history is here for follow-up of type 1 diabetes mellitus. She is in primary care of Dr. Francisco Cooney. Patient was previously seeing endocrinologist Dr. Brandon Field.    She was diagnosed with diabetes when she was 21 months old. She was initially on injections. Started using insulin pump in 2009. Patient started using tandem T slim insulin pump with control IQ system in September 2020. However she was having a lot of fluctuations in blood glucose with control IQ system, so she stopped using it. However she continues to use Dexcom. She is trying to do better in terms of entering all her carbohydrate intake. At the last visit she was complaining of hypoglycemia. Especially when it is time to eat. So we had decreased basal rates throughout. She is doing much better now. Glucometer reading: Insulin pump was downloaded and reviewed with patient. This will be scanned into patient's chart.     Updated diabetes history:  · Diagnosis: 21 months old    · Current treatment: pump since 2009    · Past treatment: injections novolog, lantus, humalog     · Glucose checks: yes 200-400s    · Hyperglycemia: yes    · Hypoglycemia: no    · Meals per day: 3, carb counting, 3 meals + a snacks at bedtime,     · Exercise: walks     · DM related hospitalizations: no,         Complications of DM:    · CAD: no    · CVA: no    · PVD: no    · Amputations: no     · Retinopathy: no; last exam was 8/2019    · Gastropathy: no    · Nephropathy: no    · Neuropathy: no        Medications:    · Statin: NA    · ACE-I: NA    · ASA: NA        · Diabetes education: long time back   Pump     Pump : Tandem T slim    Insulin type Humalog    Insulin Time: 5 hours    Insulin Pump Settings    Basal rate:  Midnight to 2 AM: 1.8  2 AM to 9 AM: 1.6  9 AM to midnight: 1.8    Carb ratio 1: 4    Insulin sensitivity: 20    Blood glucose target: 110  Past Medical History:   Diagnosis Date    Type 1 diabetes mellitus, uncontrolled ([address]      Past Surgical History:   Procedure Laterality Date    HX CYST REMOVAL N/A 02/09/2020    Patient had a cyst removal 02/09/2020    HX WISDOM TEETH EXTRACTION        Family History   Problem Relation Age of Onset    Diabetes Mother     Hypertension Mother     Diabetes Maternal Grandmother     Diabetes Maternal Grandfather     Diabetes Maternal Uncle     Hypertension Paternal Grandfather      Social History     Tobacco Use    Smoking status: Never Smoker    Smokeless tobacco: Never Used   Substance Use Topics    Alcohol use: Yes     Comment: occ      Prior to Admission medications    Medication Sig Start Date End Date Taking? Authorizing Provider   insulin lispro (HumaLOG U-100 Insulin) 100 unit/mL injection USE IN INSULIN PUMP AS DIRECTED -- MAX DAILY DOSE = 80 UNITS, 30 days 2/24/21  Yes Ana Maria Posadas MD   flash glucose scanning reader (FREESTYLE STACEY 10 DAY READER) misc Use as directed 10/9/18  Yes Kt Beckham MD   flash glucose sensor (FREESTYLE STACEY 10 DAY SENSOR) kit Once every 10 days 10/9/18  Yes Kt Beckham MD   glucose blood VI test strips (FREESTYLE LITE STRIPS) strip Test up to 3 times a day Dx Code E10.65 2/9/16  Yes Kt Beckham MD   Lancets (FREESTYLE LANCETS) misc Test up to 3 times daily.  Dx code E10.65 2/9/16  Yes Kt Beckham MD   Blood-Glucose Meter (FREESTYLE LITE METER) monitoring kit To test daily Dx Code 250.00 8/6/15  Yes Kt Beckham MD        No Known Allergies    Review of Systems:  ROS    A comprehensive review of systems was preformed and it is negative except mentioned in HPI    Objective:     Vitals:    02/24/21 1614   BP: (!) 140/86   Pulse: (!) 105   Temp: 98.6 °F (37 °C)   TempSrc: Temporal   SpO2: 98%   Weight: 158 lb 1.6 oz (71.7 kg) Height: 5' 5\" (1.651 m)        Physical Exam:    Physical Exam  Vitals signs and nursing note reviewed. Constitutional:       Appearance: Normal appearance. HENT:      Head: Normocephalic and atraumatic. Eyes:      Extraocular Movements: Extraocular movements intact. Cardiovascular:      Rate and Rhythm: Normal rate and regular rhythm. Pulmonary:      Effort: Pulmonary effort is normal.      Breath sounds: Normal breath sounds. Neurological:      General: No focal deficit present. Mental Status: She is alert. Psychiatric:         Mood and Affect: Mood normal.         Behavior: Behavior normal.         Thought Content: Thought content normal.         Judgment: Judgment normal.       diabetic foot exam:  Bilateral diabetic foot exam was performed today. Dorsalis pedis pulses 2+ bilaterally. Monofilament sensation normal bilaterally. No ulcers or skin breakdown. Labs and Imaging:  Results for En Reyes (MRN [id number]) as of 2/24/2021 16:45   Ref. Range 2/18/2021 10:41   Sodium Latest Ref Range: 134 - 144 mmol/L 142   Potassium Latest Ref Range: 3.5 - 5.2 mmol/L 4.4   Chloride Latest Ref Range: 96 - 106 mmol/L 105   CO2 Latest Ref Range: 20 - 29 mmol/L 23   Glucose Latest Ref Range: 65 - 99 mg/dL 62 (L)   BUN Latest Ref Range: 6 - 20 mg/dL 12   Creatinine Latest Ref Range: 0.57 - 1.00 mg/dL 0.69   BUN/Creatinine ratio Latest Ref Range: 9 - 23  17   Calcium Latest Ref Range: 8.7 - 10.2 mg/dL 9.1   GFR est non-AA Latest Ref Range: >59 mL/min/1.73 119   GFR est AA Latest Ref Range: >59 mL/min/1.73 137   Bilirubin, total Latest Ref Range: 0.0 - 1.2 mg/dL 0.2   Protein, total Latest Ref Range: 6.0 - 8.5 g/dL 7.1   Albumin Latest Ref Range: 3.9 - 5.0 g/dL 4.6   A-G Ratio Latest Ref Range: 1.2 - 2.2  1.8   ALT Latest Ref Range: 0 - 32 IU/L 12   AST Latest Ref Range: 0 - 40 IU/L 16   Alk.  phosphatase Latest Ref Range: 39 - 117 IU/L 91   Triglyceride Latest Ref Range: 0 - 149 mg/dL 69 Cholesterol, total Latest Ref Range: 100 - 199 mg/dL 156   HDL Cholesterol Latest Ref Range: >39 mg/dL 56   VLDL, calculated Latest Ref Range: 5 - 40 mg/dL 14   LDL, calculated Latest Ref Range: 0 - 99 mg/dL 86   Results for Antonio Castanon (MRN [id number]) as of 2/24/2021 17:02   Ref. Range 2/18/2021 10:41   Creatinine, urine Latest Ref Range: Not Estab. mg/dL 160.2   Microalbumin, urine Latest Ref Range: Not Estab. ug/mL 16.4   Microalbumin/Creat. Ratio Latest Ref Range: 0 - 29 mg/g creat 10   TSH Latest Ref Range: 0.450 - 4.500 uIU/mL 2.130     Last 3 Recorded Weights in this Encounter    02/24/21 1614   Weight: 158 lb 1.6 oz (71.7 kg)        Lab Results   Component Value Date/Time    Hemoglobin A1c, External 9.9 01/12/2016    Hemoglobin A1c, External 9.6 04/29/2015 08:26 AM        Assessment:     Patient Active Problem List   Diagnosis Code    Type I (juvenile type) diabetes mellitus without mention of complication, uncontrolled E10.65    Hypoglycemia E16.2    Hyperglycemia due to type 1 diabetes mellitus ([facility] [address] E10.65    Encounter for long-term (current) use of insulin ([facility]) Z79.4    Insulin pump titration Z46.81    Uncontrolled type 1 diabetes mellitus with hyperglycemia ([facility]) E10.65           Plan:     Type 1 diabetes mellitus uncontrolled:  Hemoglobin A1c was 12.1% on 2-, 12% on 6-9-2020, 6.4% on 11-4-2020, 7.2% today. Fingerstick blood glucose is 178 mg/dL in my office today. Up to date with diabetes related annual labs: yes 2-2021  Up to date with diabetic eye exam: yes, next apt in 5-2021  Plan:  Reviewed patient's insulin pump and dexcom download. Hypoglycemia is much better but still happening but no particular trend. I am not making any changes today. Encouraged patient to keep appointment for diabetic eye exam.  I will see her back in 3 months. Time spent with patient: 25 minutes with more than 50% time spent face-to-face in counseling and patient education.     Orders Placed This Encounter    AMB POC HEMOGLOBIN A1C    AMB POC GLUCOSE BLOOD, BY GLUCOSE MONITORING DEVICE    insulin lispro (HumaLOG U-100 Insulin) 100 unit/mL injection     Sig: USE IN INSULIN PUMP AS DIRECTED -- MAX DAILY DOSE = 80 UNITS, 30 days     Dispense:  30 mL     Refill:  5        Signed By: Sofia Kimbrough MD     February 24, 2021      Return to clinic 3 months

## 2021-03-04 ENCOUNTER — TELEPHONE (OUTPATIENT)
Dept: ENDOCRINOLOGY | Age: 31
End: 2021-03-04

## 2021-03-04 DIAGNOSIS — E10.65 UNCONTROLLED TYPE 1 DIABETES MELLITUS WITH HYPERGLYCEMIA (HCC): ICD-10-CM

## 2021-03-04 RX ORDER — INSULIN LISPRO 100 [IU]/ML
INJECTION, SOLUTION INTRAVENOUS; SUBCUTANEOUS
Qty: 30 ML | Refills: 5
Start: 2021-03-04 | End: 2021-05-25 | Stop reason: SDUPTHER

## 2021-05-25 ENCOUNTER — OFFICE VISIT (OUTPATIENT)
Dept: ENDOCRINOLOGY | Age: 31
End: 2021-05-25
Payer: COMMERCIAL

## 2021-05-25 VITALS
TEMPERATURE: 99.7 F | WEIGHT: 159.9 LBS | HEART RATE: 91 BPM | OXYGEN SATURATION: 99 % | DIASTOLIC BLOOD PRESSURE: 83 MMHG | SYSTOLIC BLOOD PRESSURE: 115 MMHG | BODY MASS INDEX: 26.64 KG/M2 | HEIGHT: 65 IN

## 2021-05-25 DIAGNOSIS — E10.65 UNCONTROLLED TYPE 1 DIABETES MELLITUS WITH HYPERGLYCEMIA (HCC): Primary | ICD-10-CM

## 2021-05-25 LAB
BILIRUB UR QL STRIP: NEGATIVE
GLUCOSE POC: 304 MG/DL
GLUCOSE UR-MCNC: NORMAL MG/DL
HBA1C MFR BLD HPLC: 8.4 %
KETONES P FAST UR STRIP-MCNC: NORMAL MG/DL
PH UR STRIP: 5 [PH] (ref 4.6–8)
PROT UR QL STRIP: NEGATIVE
SP GR UR STRIP: 1.02 (ref 1–1.03)
UA UROBILINOGEN AMB POC: NORMAL (ref 0.2–1)
URINALYSIS CLARITY POC: CLEAR
URINALYSIS COLOR POC: YELLOW
URINE BLOOD POC: NEGATIVE
URINE LEUKOCYTES POC: NEGATIVE
URINE NITRITES POC: NEGATIVE

## 2021-05-25 PROCEDURE — 83036 HEMOGLOBIN GLYCOSYLATED A1C: CPT | Performed by: INTERNAL MEDICINE

## 2021-05-25 PROCEDURE — 81003 URINALYSIS AUTO W/O SCOPE: CPT | Performed by: INTERNAL MEDICINE

## 2021-05-25 PROCEDURE — 95251 CONT GLUC MNTR ANALYSIS I&R: CPT | Performed by: INTERNAL MEDICINE

## 2021-05-25 PROCEDURE — 99214 OFFICE O/P EST MOD 30 MIN: CPT | Performed by: INTERNAL MEDICINE

## 2021-05-25 PROCEDURE — 3052F HG A1C>EQUAL 8.0%<EQUAL 9.0%: CPT | Performed by: INTERNAL MEDICINE

## 2021-05-25 PROCEDURE — 82962 GLUCOSE BLOOD TEST: CPT | Performed by: INTERNAL MEDICINE

## 2021-05-25 RX ORDER — INSULIN LISPRO 100 [IU]/ML
INJECTION, SOLUTION INTRAVENOUS; SUBCUTANEOUS
Qty: 30 ML | Refills: 5
Start: 2021-05-25 | End: 2021-08-27 | Stop reason: SDUPTHER

## 2021-05-25 NOTE — LETTER
5/26/2021 Patient: Velvet Chu YOB: 1990 Date of Visit: 5/25/2021 Jimbo Woo MD 
[address] [address] [address] Fax: [phone] Dear Jimbo Woo MD, Thank you for referring Ms. Cristi Quiroz to [address] [place] [address] for evaluation. My notes for this consultation are attached. If you have questions, please do not hesitate to call me. I look forward to following your patient along with you. Sincerely, Jasiel Qureshi MD

## 2021-05-25 NOTE — PROGRESS NOTES
History and Physical    Patient: Jett Lombardi MRN: [id number]  SSN: [id number]    YOB: 1990  Age: 36 y.o. Sex: female      Subjective:      Jett Lombardi is a 36 y.o. female no significant past medical history is here for follow-up of type 1 diabetes mellitus. She is in primary care of Dr. Abdoulaye Hernández. Patient was previously seeing endocrinologist Dr. Kingsley Will.    She was diagnosed with diabetes when she was 21 months old. She was initially on injections. Started using insulin pump in 2009. Patient started using tandem T slim insulin pump with control IQ system in September 2020. However she was having a lot of fluctuations in blood glucose with control IQ system, so she stopped using it. However she continues to use Dexcom. She is trying to do better in terms of entering all her carbohydrate intake, but this is still a struggle because patient's mom cooks for her and she does not know what exactly goes and it. Does not have any more hypoglycemia. She was having skin reaction at the site where she applies Dexcom, so she is taking break for a few days and she is checking glucose with fingersticks. She had diabetic eye exam and she was diagnosed with diabetic retinopathy. She is getting injections in right eye. Left eye has retinopathy but it is not that bad right now. Glucometer reading: Insulin pump was downloaded and reviewed with patient. This will be scanned into patient's chart.     Updated diabetes history:  · Diagnosis: 21 months old    · Current treatment: pump since 2009    · Past treatment: injections novolog, lantus, humalog     · Glucose checks: yes 200-400s    · Hyperglycemia: yes    · Hypoglycemia: no    · Meals per day: 3, carb counting, 3 meals + a snacks at bedtime,     · Exercise: walks     · DM related hospitalizations: no,         Complications of DM:    · CAD: no    · CVA: no    · PVD: no    · Amputations: no     · Retinopathy: no; last exam was 8/2019    · Gastropathy: no    · Nephropathy: no    · Neuropathy: no        Medications:    · Statin: NA    · ACE-I: NA    · ASA: NA        · Diabetes education: long time back   Pump     Pump : Tandem T slim    Insulin type Humalog    Insulin Time: 5 hours    Insulin Pump Settings    Basal rate:  Midnight to 2 AM: 1.8  2 AM to 9 AM: 1.6  9 AM to midnight: 1.8    Carb ratio 1: 4    Insulin sensitivity: 20    Blood glucose target: 110  Past Medical History:   Diagnosis Date    Diabetic retinal damage of right eye ([facility] [address]     Type 1 diabetes mellitus, uncontrolled ([facility] [address]      Past Surgical History:   Procedure Laterality Date    HX CYST REMOVAL N/A 02/09/2020    Patient had a cyst removal 02/09/2020    HX WISDOM TEETH EXTRACTION        Family History   Problem Relation Age of Onset    Diabetes Mother     Hypertension Mother     Diabetes Maternal Grandmother     Diabetes Maternal Grandfather     Diabetes Maternal Uncle     Hypertension Paternal Grandfather      Social History     Tobacco Use    Smoking status: Never Smoker    Smokeless tobacco: Never Used   Substance Use Topics    Alcohol use: Yes     Comment: occ      Prior to Admission medications    Medication Sig Start Date End Date Taking? Authorizing Provider   insulin lispro (HumaLOG U-100 Insulin) 100 unit/mL injection USE IN INSULIN PUMP AS DIRECTED -- MAX DAILY DOSE = 80 UNITS, 30 days 5/25/21  Yes Abel Samaniego MD   flash glucose scanning reader (FREESTYLE STACEY 10 DAY READER) misc Use as directed 10/9/18  Yes Andie Bray MD   flash glucose sensor (FREESTYLE STACEY 10 DAY SENSOR) kit Once every 10 days 10/9/18  Yes Andie Bray MD   glucose blood VI test strips (FREESTYLE LITE STRIPS) strip Test up to 3 times a day Dx Code E10.65 2/9/16  Yes Andie Bray MD   Lancets (FREESTYLE LANCETS) misc Test up to 3 times daily.  Dx code E10.65 2/9/16  Yes Andie Bray MD   Blood-Glucose Meter (FREESTYLE LITE METER) monitoring kit To test daily Dx Code 250.00 8/6/15  Yes Hong Adler MD        Allergies   Allergen Reactions    Broccoli Nausea and Vomiting       Review of Systems:  ROS    A comprehensive review of systems was preformed and it is negative except mentioned in HPI    Objective:     Vitals:    05/25/21 1625   BP: 115/83   Pulse: 91   Temp: 99.7 °F (37.6 °C)   TempSrc: Temporal   SpO2: 99%   Weight: 159 lb 14.4 oz (72.5 kg)   Height: 5' 5\" (1.651 m)        Physical Exam:    Physical Exam  Vitals and nursing note reviewed. Constitutional:       Appearance: Normal appearance. HENT:      Head: Normocephalic and atraumatic. Eyes:      Extraocular Movements: Extraocular movements intact. Cardiovascular:      Rate and Rhythm: Normal rate and regular rhythm. Pulmonary:      Effort: Pulmonary effort is normal.      Breath sounds: Normal breath sounds. Neurological:      General: No focal deficit present. Mental Status: She is alert. Psychiatric:         Mood and Affect: Mood normal.         Behavior: Behavior normal.         Thought Content: Thought content normal.         Judgment: Judgment normal.     2-:  diabetic foot exam:  Bilateral diabetic foot exam was performed today. Dorsalis pedis pulses 2+ bilaterally. Monofilament sensation normal bilaterally. No ulcers or skin breakdown. Procedure note:  CGM download: Dexcom was downloaded from 4- to 5-  CGMS TRACING PATTERNS    Overnight Trends (the most consistent pattern): Euglycemia       prandial trends: Postprandial hyperglycemia after breakfast, worse after lunch and after dinner    Additional Comments  See CGMS download and CGMS Patient Log in     CGMS RECOMMENDATIONS    Medication Changes: Carb ratio appears to be pretty aggressive but it appears that patient is having difficulty with carb counting. Discussed with patient about working on this, referred to diabetes educator/dietitian mainly for carb counting education. Advised patient to include her mother in this class. Labs and Imaging:  Results for Eleazar Sibley (MRN [id number]) as of 2/24/2021 16:45   Ref. Range 2/18/2021 10:41   Sodium Latest Ref Range: 134 - 144 mmol/L 142   Potassium Latest Ref Range: 3.5 - 5.2 mmol/L 4.4   Chloride Latest Ref Range: 96 - 106 mmol/L 105   CO2 Latest Ref Range: 20 - 29 mmol/L 23   Glucose Latest Ref Range: 65 - 99 mg/dL 62 (L)   BUN Latest Ref Range: 6 - 20 mg/dL 12   Creatinine Latest Ref Range: 0.57 - 1.00 mg/dL 0.69   BUN/Creatinine ratio Latest Ref Range: 9 - 23  17   Calcium Latest Ref Range: 8.7 - 10.2 mg/dL 9.1   GFR est non-AA Latest Ref Range: >59 mL/min/1.73 119   GFR est AA Latest Ref Range: >59 mL/min/1.73 137   Bilirubin, total Latest Ref Range: 0.0 - 1.2 mg/dL 0.2   Protein, total Latest Ref Range: 6.0 - 8.5 g/dL 7.1   Albumin Latest Ref Range: 3.9 - 5.0 g/dL 4.6   A-G Ratio Latest Ref Range: 1.2 - 2.2  1.8   ALT Latest Ref Range: 0 - 32 IU/L 12   AST Latest Ref Range: 0 - 40 IU/L 16   Alk. phosphatase Latest Ref Range: 39 - 117 IU/L 91   Triglyceride Latest Ref Range: 0 - 149 mg/dL 69   Cholesterol, total Latest Ref Range: 100 - 199 mg/dL 156   HDL Cholesterol Latest Ref Range: >39 mg/dL 56   VLDL, calculated Latest Ref Range: 5 - 40 mg/dL 14   LDL, calculated Latest Ref Range: 0 - 99 mg/dL 86   Results for Eleazar Sibley (MRN [id number]) as of 2/24/2021 17:02   Ref. Range 2/18/2021 10:41   Creatinine, urine Latest Ref Range: Not Estab. mg/dL 160.2   Microalbumin, urine Latest Ref Range: Not Estab. ug/mL 16.4   Microalbumin/Creat.  Ratio Latest Ref Range: 0 - 29 mg/g creat 10   TSH Latest Ref Range: 0.450 - 4.500 uIU/mL 2.130     Last 3 Recorded Weights in this Encounter    05/25/21 3238   Weight: 159 lb 14.4 oz (72.5 kg)        Lab Results   Component Value Date/Time    Hemoglobin A1c, External 9.9 01/12/2016 12:00 AM    Hemoglobin A1c, External 9.6 04/29/2015 08:26 AM        Assessment:     Patient Active Problem List Diagnosis Code    Type I (juvenile type) diabetes mellitus without mention of complication, uncontrolled E10.65    Hypoglycemia E16.2    Hyperglycemia due to type 1 diabetes mellitus ([facility] [address] E10.65    Encounter for long-term (current) use of insulin ([facility]) Z79.4    Insulin pump titration Z46.81    Uncontrolled type 1 diabetes mellitus with hyperglycemia ([facility]) E10.65    Diabetic retinopathy associated with type 1 diabetes mellitus ([facility] [address] E10.319           Plan:     Type 1 diabetes mellitus uncontrolled:  Hemoglobin A1c was 12.1% on 2-, 12% on 6-9-2020, 6.4% on 11-4-2020, 7.2% on 2-, 8.4% today. Fingerstick blood glucose is 304 mg/dL in my office today. Urine is showing moderate ketones. Up to date with diabetes related annual labs: yes 2-2021  Up to date with diabetic eye exam: yes, next apt in 5-2021  Plan:  Reviewed patient's insulin pump and dexcom download. Patient is struggling with carbohydrate counting. Carb ratio appears to be very good because it is 1 unit for 4 g of carbohydrate which is quite aggressive for type I diabetic. I will refer patient to dietitian/diabetes educator mainly to get help with carbohydrate counting. Discussed with patient to talk to her mother about this and maybe include her in the class. I will see her back in 3 months. Regarding skin reaction at the site of Dexcom application, advised patient to use skin tac over-the-counter. Diabetic retinopathy:  Worse in right eye than left. Getting injections.       Orders Placed This Encounter    REFERRAL TO DIABETIC EDUCATION     Referral Priority:   Routine     Referral Type:   Consultation     Referral Reason:   Specialty Services Required     Number of Visits Requested:   1    AMB POC GLUCOSE BLOOD, BY GLUCOSE MONITORING DEVICE    AMB POC HEMOGLOBIN A1C    AMB POC URINALYSIS DIP STICK AUTO W/O MICRO    [place] CONTINUOUS GLUCOSE MONITORING ANALYSIS I&R    insulin lispro (HumaLOG U-100 Insulin) 100 unit/mL injection     Sig: USE IN INSULIN PUMP AS DIRECTED -- MAX DAILY DOSE = 80 UNITS, 30 days     Dispense:  30 mL     Refill:  5        Signed By: Marely Sal MD     May 26, 2021      Return to clinic 3 months

## 2021-05-26 PROBLEM — E10.319 DIABETIC RETINOPATHY ASSOCIATED WITH TYPE 1 DIABETES MELLITUS (HCC): Status: ACTIVE | Noted: 2021-05-26

## 2021-05-27 DIAGNOSIS — E10.65 UNCONTROLLED TYPE 1 DIABETES MELLITUS WITH HYPERGLYCEMIA (HCC): Primary | ICD-10-CM

## 2021-08-27 ENCOUNTER — OFFICE VISIT (OUTPATIENT)
Dept: ENDOCRINOLOGY | Age: 31
End: 2021-08-27
Payer: COMMERCIAL

## 2021-08-27 VITALS
TEMPERATURE: 97.9 F | WEIGHT: 156.4 LBS | HEART RATE: 112 BPM | OXYGEN SATURATION: 98 % | SYSTOLIC BLOOD PRESSURE: 122 MMHG | BODY MASS INDEX: 26.06 KG/M2 | HEIGHT: 65 IN | DIASTOLIC BLOOD PRESSURE: 80 MMHG

## 2021-08-27 DIAGNOSIS — E10.65 UNCONTROLLED TYPE 1 DIABETES MELLITUS WITH HYPERGLYCEMIA (HCC): Primary | ICD-10-CM

## 2021-08-27 LAB
GLUCOSE POC: 188 MG/DL
HBA1C MFR BLD HPLC: 8.3 %

## 2021-08-27 PROCEDURE — 95251 CONT GLUC MNTR ANALYSIS I&R: CPT | Performed by: INTERNAL MEDICINE

## 2021-08-27 PROCEDURE — 83036 HEMOGLOBIN GLYCOSYLATED A1C: CPT | Performed by: INTERNAL MEDICINE

## 2021-08-27 PROCEDURE — 82962 GLUCOSE BLOOD TEST: CPT | Performed by: INTERNAL MEDICINE

## 2021-08-27 PROCEDURE — 3052F HG A1C>EQUAL 8.0%<EQUAL 9.0%: CPT | Performed by: INTERNAL MEDICINE

## 2021-08-27 PROCEDURE — 99214 OFFICE O/P EST MOD 30 MIN: CPT | Performed by: INTERNAL MEDICINE

## 2021-08-27 RX ORDER — INSULIN LISPRO 100 [IU]/ML
INJECTION, SOLUTION INTRAVENOUS; SUBCUTANEOUS
Qty: 30 ML | Refills: 5
Start: 2021-08-27 | End: 2021-09-29 | Stop reason: SDUPTHER

## 2021-08-27 NOTE — PROGRESS NOTES
History and Physical    Patient: Danita Do MRN: [id number]  SSN: [id number]    YOB: 1990  Age: 36 y.o. Sex: female      Subjective:      Danita Do is a 36 y.o. female no significant past medical history is here for follow-up of type 1 diabetes mellitus. She is in primary care of Dr. Kashif Hyatt. Patient was previously seeing endocrinologist Dr. Wade Lawrence.    She was diagnosed with diabetes when she was 21 months old. She was initially on injections. Started using insulin pump in 2009. Patient started using tandem T slim insulin pump with control IQ system in September 2020. However she was having a lot of fluctuations in blood glucose with control IQ system, so she stopped using it. However, a few days back she went back on it because of how high her glucose was running. At the last visit I had referred her to diabetes education to help with carb counting but she did not get a chance to make appointment. She will not be able to make appointment at this time because of cost.  In the morning she generally drinks the same protein shake every day, lunch is usually leftovers from dinner, dinner is prepared by her mother and that is when patient has difficulty with carb counting. She is doing her best to enter all carbohydrate intake. She is not snacking in between meals. Does not drink any sugary beverages. She had diabetic eye exam and she was diagnosed with diabetic retinopathy. She is getting injections in right eye. Left eye has retinopathy but it is not that bad right now. Last injection was 8-4-2021. She is going every 4 to 6 weeks. Glucometer reading: Insulin pump was downloaded and reviewed with patient. This will be scanned into patient's chart.     Updated diabetes history:  · Diagnosis: 21 months old    · Current treatment: pump since 2009    · Past treatment: injections novolog, lantus, humalog     · Glucose checks: yes 200-400s    · Hyperglycemia: yes    · Hypoglycemia: no    · Meals per day: 3, carb counting, 3 meals + a snacks at bedtime,     · Exercise: walks     · DM related hospitalizations: no,         Complications of DM:    · CAD: no    · CVA: no    · PVD: no    · Amputations: no     · Retinopathy: yes bilateral, s/p injections in right; last exam was 8-4-2021    · Gastropathy: no    · Nephropathy: no    · Neuropathy: no        Medications:    · Statin: NA    · ACE-I: NA    · ASA: NA        · Diabetes education: long time back   Pump     Pump : Tandem T slim    Insulin type Humalog    Insulin Time: 5 hours    Insulin Pump Settings    Basal rate:  Midnight to 2 AM: 1.8  2 AM to 9 AM: 1.6  9 AM to midnight: 1.8    Carb ratio 1: 4    Insulin sensitivity: 20    Blood glucose target: 110  Past Medical History:   Diagnosis Date    Diabetic [facility] damage of right eye ([facility] [address]     Type 1 diabetes mellitus, uncontrolled ([facility] [address]      Past Surgical History:   Procedure Laterality Date    HX CYST REMOVAL N/A 02/09/2020    Patient had a cyst removal 02/09/2020    HX WISDOM TEETH EXTRACTION        Family History   Problem Relation Age of Onset    Diabetes Mother     Hypertension Mother     Diabetes Maternal Grandmother     Diabetes Maternal Grandfather     Diabetes Maternal Uncle     Hypertension Paternal Grandfather      Social History     Tobacco Use    Smoking status: Never Smoker    Smokeless tobacco: Never Used   Substance Use Topics    Alcohol use: Yes     Comment: occ      Prior to Admission medications    Medication Sig Start Date End Date Taking?  Authorizing Provider   insulin lispro (HumaLOG U-100 Insulin) 100 unit/mL injection USE IN INSULIN PUMP AS DIRECTED -- MAX DAILY DOSE = 80 UNITS, 30 days 8/27/21  Yes Nesha Mccullough MD   flash glucose scanning reader (FREESTYLE STACEY 10 DAY READER) misc Use as directed 10/9/18  Yes Pama Jeans, MD   flash glucose sensor (FREESTYLE STACEY 10 DAY SENSOR) kit Once every 10 days 10/9/18  Yes Hu Nieto MD   glucose blood VI test strips (FREESTYLE LITE STRIPS) strip Test up to 3 times a day Dx Code E10.65 2/9/16  Yes Hu Nieto MD   Lancets (FREESTYLE LANCETS) misc Test up to 3 times daily. Dx code E10.65 2/9/16  Yes Hu Nieto MD   Blood-Glucose Meter (FREESTYLE LITE METER) monitoring kit To test daily Dx Code 250.00 8/6/15  Yes Hu Nieto MD        Allergies   Allergen Reactions    Broccoli Nausea and Vomiting       Review of Systems:  ROS    A comprehensive review of systems was preformed and it is negative except mentioned in HPI    Objective:     Vitals:    08/27/21 1359   BP: 122/80   Pulse: (!) 112   Temp: 97.9 °F (36.6 °C)   TempSrc: Temporal   SpO2: 98%   Weight: 156 lb 6.4 oz (70.9 kg)   Height: 5' 5\" (1.651 m)        Physical Exam:    Physical Exam  Vitals and nursing note reviewed. Constitutional:       Appearance: Normal appearance. HENT:      Head: Normocephalic and atraumatic. Eyes:      Extraocular Movements: Extraocular movements intact. Cardiovascular:      Rate and Rhythm: Normal rate and regular rhythm. Pulmonary:      Effort: Pulmonary effort is normal.      Breath sounds: Normal breath sounds. Neurological:      General: No focal deficit present. Mental Status: She is alert. Psychiatric:         Mood and Affect: Mood normal.         Behavior: Behavior normal.         Thought Content: Thought content normal.         Judgment: Judgment normal.     2-:  diabetic foot exam:  Bilateral diabetic foot exam was performed today. Dorsalis pedis pulses 2+ bilaterally. Monofilament sensation normal bilaterally. No ulcers or skin breakdown.     Procedure note:  CGM download: Dexcom was downloaded from 8--8-  CGMS TRACING PATTERNS    Overnight Trends (the most consistent pattern): Staying somewhat in the normal range since patient went back on control IQ       prandial trends: Postprandial hyperglycemia mainly after lunch and dinner    Additional Comments  See CGMS download and CGMS Patient Log in     CGMS RECOMMENDATIONS    Medication Changes: Increase basal rate from midnight2 AM to 2 units/h, from 2 AM9 AM from 1.6 to 1.8 units/h, 9 AM to midnight from 1.8 to 2 units/h. Labs and Imaging:  Results for Ganesh Catalan (MRN [id number]) as of 2/24/2021 16:45   Ref. Range 2/18/2021 10:41   Sodium Latest Ref Range: 134 - 144 mmol/L 142   Potassium Latest Ref Range: 3.5 - 5.2 mmol/L 4.4   Chloride Latest Ref Range: 96 - 106 mmol/L 105   CO2 Latest Ref Range: 20 - 29 mmol/L 23   Glucose Latest Ref Range: 65 - 99 mg/dL 62 (L)   BUN Latest Ref Range: 6 - 20 mg/dL 12   Creatinine Latest Ref Range: 0.57 - 1.00 mg/dL 0.69   BUN/Creatinine ratio Latest Ref Range: 9 - 23  17   Calcium Latest Ref Range: 8.7 - 10.2 mg/dL 9.1   GFR est non-AA Latest Ref Range: >59 mL/min/1.73 119   GFR est AA Latest Ref Range: >59 mL/min/1.73 137   Bilirubin, total Latest Ref Range: 0.0 - 1.2 mg/dL 0.2   Protein, total Latest Ref Range: 6.0 - 8.5 g/dL 7.1   Albumin Latest Ref Range: 3.9 - 5.0 g/dL 4.6   A-G Ratio Latest Ref Range: 1.2 - 2.2  1.8   ALT Latest Ref Range: 0 - 32 IU/L 12   AST Latest Ref Range: 0 - 40 IU/L 16   Alk. phosphatase Latest Ref Range: 39 - 117 IU/L 91   Triglyceride Latest Ref Range: 0 - 149 mg/dL 69   Cholesterol, total Latest Ref Range: 100 - 199 mg/dL 156   HDL Cholesterol Latest Ref Range: >39 mg/dL 56   VLDL, calculated Latest Ref Range: 5 - 40 mg/dL 14   LDL, calculated Latest Ref Range: 0 - 99 mg/dL 86   Results for Ganesh Catalan (MRN [id number]) as of 2/24/2021 17:02   Ref. Range 2/18/2021 10:41   Creatinine, urine Latest Ref Range: Not Estab. mg/dL 160.2   Microalbumin, urine Latest Ref Range: Not Estab. ug/mL 16.4   Microalbumin/Creat.  Ratio Latest Ref Range: 0 - 29 mg/g creat 10   TSH Latest Ref Range: 0.450 - 4.500 uIU/mL 2.130     Last 3 Recorded Weights in this Encounter    08/27/21 1359   Weight: 156 lb 6.4 oz (70.9 kg)        Lab Results   Component Value Date/Time    Hemoglobin A1c, External 9.9 01/12/2016 12:00 AM    Hemoglobin A1c, External 9.6 04/29/2015 08:26 AM        Assessment:     Patient Active Problem List   Diagnosis Code    Type I (juvenile type) diabetes mellitus without mention of complication, uncontrolled E10.65    Hypoglycemia E16.2    Hyperglycemia due to type 1 diabetes mellitus ([facility] E10.65    Encounter for long-term (current) use of insulin ([facility]) Z79.4    Insulin pump titration Z46.81    Uncontrolled type 1 diabetes mellitus with hyperglycemia ([facility] [address] E10.65    Diabetic retinopathy associated with type 1 diabetes mellitus ([facility] E10.319           Plan:     Type 1 diabetes mellitus uncontrolled:  Hemoglobin A1c was 12.1% on 2-, 12% on 6-9-2020, 6.4% on 11-4-2020, 7.2% on 2-, 8.4% on 5--, 8.3% today. Fingerstick blood glucose is 188 mg/dL in my office today. Up to date with diabetes related annual labs: yes 2-2021  Up to date with diabetic eye exam: yes  Plan:  Reviewed patient's pump and Dexcom download with her. She clearly needs to work on carb counting. However, I am going to increase her basal rate as follows:  Midnight to 2 AM: 1.8 --> 2  2 AM to 9 AM: 1.6 --> 1.8  9 AM to midnight: 1.8 --> 2  I will see her back in 3 months. Diabetic retinopathy:  Worse in right eye than left. Getting injections.     Orders Placed This Encounter    AMB POC GLUCOSE BLOOD, BY GLUCOSE MONITORING DEVICE    AMB POC HEMOGLOBIN A1C    [place] CONTINUOUS GLUCOSE MONITORING ANALYSIS I&R    insulin lispro (HumaLOG U-100 Insulin) 100 unit/mL injection     Sig: USE IN INSULIN PUMP AS DIRECTED -- MAX DAILY DOSE = 80 UNITS, 30 days     Dispense:  30 mL     Refill:  5        Signed By: Mike Bar MD     August 27, 2021      Return to clinic 3 months

## 2021-08-27 NOTE — LETTER
8/27/2021    Patient: Salo Kennedy   YOB: 1990   Date of Visit: 8/27/2021     Magui Elam MD  [address] [facility] [address]  Via Fax: [phone]    Dear Magui Elam MD,      Thank you for referring Ms. Daija Valdovinos to [address] [place] [address] for evaluation. My notes for this consultation are attached. If you have questions, please do not hesitate to call me. I look forward to following your patient along with you.       Sincerely,    Milvia Fletcher MD

## 2021-09-29 ENCOUNTER — TELEPHONE (OUTPATIENT)
Dept: ENDOCRINOLOGY | Age: 31
End: 2021-09-29

## 2021-09-29 DIAGNOSIS — E10.65 UNCONTROLLED TYPE 1 DIABETES MELLITUS WITH HYPERGLYCEMIA (HCC): ICD-10-CM

## 2021-09-29 RX ORDER — INSULIN LISPRO 100 [IU]/ML
INJECTION, SOLUTION INTRAVENOUS; SUBCUTANEOUS
Qty: 30 ML | Refills: 5 | Status: SHIPPED | OUTPATIENT
Start: 2021-09-29 | End: 2021-11-30 | Stop reason: SDUPTHER

## 2021-09-29 NOTE — TELEPHONE ENCOUNTER
Patient is calling to inform that [facility] states that they never received her prescription for Humalog, can you resend it to them. I called and verified that they did not receive the script.

## 2021-11-30 ENCOUNTER — OFFICE VISIT (OUTPATIENT)
Dept: ENDOCRINOLOGY | Age: 31
End: 2021-11-30
Payer: COMMERCIAL

## 2021-11-30 VITALS
DIASTOLIC BLOOD PRESSURE: 79 MMHG | BODY MASS INDEX: 26.51 KG/M2 | TEMPERATURE: 97.1 F | HEART RATE: 64 BPM | HEIGHT: 65 IN | SYSTOLIC BLOOD PRESSURE: 117 MMHG | OXYGEN SATURATION: 98 % | WEIGHT: 159.1 LBS

## 2021-11-30 DIAGNOSIS — E10.65 UNCONTROLLED TYPE 1 DIABETES MELLITUS WITH HYPERGLYCEMIA (HCC): Primary | ICD-10-CM

## 2021-11-30 LAB
GLUCOSE POC: 135 MG/DL
HBA1C MFR BLD HPLC: 6.5 %

## 2021-11-30 PROCEDURE — 99214 OFFICE O/P EST MOD 30 MIN: CPT | Performed by: INTERNAL MEDICINE

## 2021-11-30 PROCEDURE — 82962 GLUCOSE BLOOD TEST: CPT | Performed by: INTERNAL MEDICINE

## 2021-11-30 PROCEDURE — 95251 CONT GLUC MNTR ANALYSIS I&R: CPT | Performed by: INTERNAL MEDICINE

## 2021-11-30 PROCEDURE — 83036 HEMOGLOBIN GLYCOSYLATED A1C: CPT | Performed by: INTERNAL MEDICINE

## 2021-11-30 RX ORDER — INSULIN LISPRO 100 [IU]/ML
INJECTION, SOLUTION INTRAVENOUS; SUBCUTANEOUS
Qty: 30 ML | Refills: 5
Start: 2021-11-30 | End: 2022-03-01 | Stop reason: SDUPTHER

## 2021-11-30 NOTE — LETTER
11/30/2021    Patient: Madhu Parent   YOB: 1990   Date of Visit: 11/30/2021     Sherma Kayser, MD  [address] [facility] [address]  Via Fax: [phone]    Dear Sherma Kayser, MD,      Thank you for referring Ms. Vilma Patrick to [address] [place] [address] for evaluation. My notes for this consultation are attached. If you have questions, please do not hesitate to call me. I look forward to following your patient along with you.       Sincerely,    Michael Cm MD

## 2021-11-30 NOTE — PROGRESS NOTES
History and Physical    Patient: Campos Kumar MRN: [id number]  SSN: [id number]    YOB: 1990  Age: 36 y.o. Sex: female      Subjective:      Campos Kumar is a 36 y.o. female no significant past medical history is here for follow-up of type 1 diabetes mellitus. She is in primary care of Dr. Ave Rajan. Patient was previously seeing endocrinologist Dr. Nicolle Snell.    She was diagnosed with diabetes when she was 21 months old. She was initially on injections. Started using insulin pump in 2009. I had increase patient's basal rate at the last visit. Patient is using tandem insulin pump and control IQ mode. She is doing much better in terms of bolusing and entering carbs. She is noticing some low blood glucose after she boluses. No nocturnal hypoglycemia. In the morning she generally drinks the same protein shake every day, lunch is usually leftovers from dinner, dinner is prepared by her mother and that is when patient has difficulty with carb counting. She is doing her best to enter all carbohydrate intake. She is not snacking in between meals. Does not drink any sugary beverages. She had diabetic eye exam and she was diagnosed with diabetic retinopathy. She is getting injections in both eyes. Last injection was 11- she is going every 4 to 6 weeks. She tells me that retinopathy is getting better and they are trying to space out her appointments now. Glucometer reading: Insulin pump and Dexcom was downloaded and reviewed with patient. This will be scanned into patient's chart.     Updated diabetes history:  · Diagnosis: 21 months old    · Current treatment: pump since 2009    · Past treatment: injections novolog, lantus, humalog     · Glucose checks: yes 200-400s    · Hyperglycemia: yes    · Hypoglycemia: no    · Meals per day: 3, carb counting, 3 meals + a snacks at bedtime,     · Exercise: walks     · DM related hospitalizations: no,         Complications of DM:    · CAD: no    · CVA: no    · PVD: no    · Amputations: no     · Retinopathy: yes bilateral, s/p injections in right; last exam was 8-4-2021    · Gastropathy: no    · Nephropathy: no    · Neuropathy: no        Medications:    · Statin: NA    · ACE-I: NA    · ASA: NA        · Diabetes education: long time back   Pump     Pump : Tandem T slim    Insulin type Humalog    Insulin Time: 5 hours    Insulin Pump Settings    Basal rate:  Midnight to 2 AM: 1.8 --> 2  2 AM to 9 AM: 1.6 --> 1.8  9 AM to midnight: 1.8 --> 2    Carb ratio 1: 4    Insulin sensitivity: 20    Blood glucose target: 110  Past Medical History:   Diagnosis Date    Diabetic retinal damage of right eye ([facility]     Type 1 diabetes mellitus, uncontrolled ([facility]      Past Surgical History:   Procedure Laterality Date    HX CYST REMOVAL N/A 02/09/2020    Patient had a cyst removal 02/09/2020    HX WISDOM TEETH EXTRACTION        Family History   Problem Relation Age of Onset    Diabetes Mother     Hypertension Mother     Diabetes Maternal Grandmother     Diabetes Maternal Grandfather     Diabetes Maternal Uncle     Hypertension Paternal Grandfather      Social History     Tobacco Use    Smoking status: Never Smoker    Smokeless tobacco: Never Used   Substance Use Topics    Alcohol use: Not Currently     Comment: occ      Prior to Admission medications    Medication Sig Start Date End Date Taking? Authorizing Provider   insulin lispro (HumaLOG U-100 Insulin) 100 unit/mL injection USE IN INSULIN PUMP AS DIRECTED -- MAX DAILY DOSE = 80 UNITS, 30 days 11/30/21  Yes Camila Hansen MD   glucose blood VI test strips (FREESTYLE LITE STRIPS) strip Test up to 3 times a day Dx Code E10.65 2/9/16  Yes Shahriar Lackey MD   Lancets (FREESTYLE LANCETS) misc Test up to 3 times daily.  Dx code E10.65 2/9/16  Yes Shahriar Lackey MD   Blood-Glucose Meter (FREESTYLE LITE METER) monitoring kit To test daily Dx Code 250.00 8/6/15  Yes Shahriar Lackey MD Allergies   Allergen Reactions    Broccoli Nausea and Vomiting       Review of Systems:  ROS    A comprehensive review of systems was preformed and it is negative except mentioned in HPI    Objective:     Vitals:    11/30/21 1507 11/30/21 1514   BP: (!) 130/92 117/79   Pulse: 80 64   Temp: 97.1 °F (36.2 °C)    TempSrc: Temporal    SpO2: 98%    Weight: 159 lb 1.6 oz (72.2 kg)    Height: 5' 5\" (1.651 m)         Physical Exam:    Physical Exam  Vitals and nursing note reviewed. Constitutional:       Appearance: Normal appearance. HENT:      Head: Normocephalic and atraumatic. Eyes:      Extraocular Movements: Extraocular movements intact. Cardiovascular:      Rate and Rhythm: Normal rate and regular rhythm. Pulmonary:      Effort: Pulmonary effort is normal.      Breath sounds: Normal breath sounds. Neurological:      General: No focal deficit present. Mental Status: She is alert. Psychiatric:         Mood and Affect: Mood normal.         Behavior: Behavior normal.         Thought Content: Thought content normal.         Judgment: Judgment normal.     2-:  diabetic foot exam:  Bilateral diabetic foot exam was performed today. Dorsalis pedis pulses 2+ bilaterally. Monofilament sensation normal bilaterally. No ulcers or skin breakdown. Procedure note:  CGM download: Dexcom was downloaded from 11--11-  CGMS TRACING PATTERNS    Overnight Trends (the most consistent pattern): Most consistent pattern is euglycemia       prandial trends: Postprandial hypoglycemia after lunch and dinner    Additional Comments  See CGMS download and CGMS Patient Log in     CGMS RECOMMENDATIONS    Medication Changes: Change carb ratio from 4-6, change sensitivity from 20-30    Labs and Imaging:  Results for [facility] (MRN [id number]) as of 2/24/2021 16:45   Ref.  Range 2/18/2021 10:41   Sodium Latest Ref Range: 134 - 144 mmol/L 142   Potassium Latest Ref Range: 3.5 - 5.2 mmol/L 4.4   Chloride Latest Ref Range: 96 - 106 mmol/L 105   CO2 Latest Ref Range: 20 - 29 mmol/L 23   Glucose Latest Ref Range: 65 - 99 mg/dL 62 (L)   BUN Latest Ref Range: 6 - 20 mg/dL 12   Creatinine Latest Ref Range: 0.57 - 1.00 mg/dL 0.69   BUN/Creatinine ratio Latest Ref Range: 9 - 23  17   Calcium Latest Ref Range: 8.7 - 10.2 mg/dL 9.1   GFR est non-AA Latest Ref Range: >59 mL/min/1.73 119   GFR est AA Latest Ref Range: >59 mL/min/1.73 137   Bilirubin, total Latest Ref Range: 0.0 - 1.2 mg/dL 0.2   Protein, total Latest Ref Range: 6.0 - 8.5 g/dL 7.1   Albumin Latest Ref Range: 3.9 - 5.0 g/dL 4.6   A-G Ratio Latest Ref Range: 1.2 - 2.2  1.8   ALT Latest Ref Range: 0 - 32 IU/L 12   AST Latest Ref Range: 0 - 40 IU/L 16   Alk. phosphatase Latest Ref Range: 39 - 117 IU/L 91   Triglyceride Latest Ref Range: 0 - 149 mg/dL 69   Cholesterol, total Latest Ref Range: 100 - 199 mg/dL 156   HDL Cholesterol Latest Ref Range: >39 mg/dL 56   VLDL, calculated Latest Ref Range: 5 - 40 mg/dL 14   LDL, calculated Latest Ref Range: 0 - 99 mg/dL 86   Results for Conner Carson (MRN [id number]) as of 2/24/2021 17:02   Ref. Range 2/18/2021 10:41   Creatinine, urine Latest Ref Range: Not Estab. mg/dL 160.2   Microalbumin, urine Latest Ref Range: Not Estab. ug/mL 16.4   Microalbumin/Creat.  Ratio Latest Ref Range: 0 - 29 mg/g creat 10   TSH Latest Ref Range: 0.450 - 4.500 uIU/mL 2.130     Last 3 Recorded Weights in this Encounter    11/30/21 1507   Weight: 159 lb 1.6 oz (72.2 kg)        Lab Results   Component Value Date/Time    Hemoglobin A1c, External 9.9 01/12/2016 12:00 AM    Hemoglobin A1c, External 9.6 04/29/2015 08:26 AM        Assessment:     Patient Active Problem List   Diagnosis Code    Type I (juvenile type) diabetes mellitus without mention of complication, uncontrolled E10.65    Hypoglycemia E16.2    Hyperglycemia due to type 1 diabetes mellitus ([facility] [address] E10.65    Encounter for long-term (current) use of insulin ([facility] [address] Z79.4    Insulin pump titration Z46.81    Uncontrolled type 1 diabetes mellitus with hyperglycemia ([facility] [address] E10.65    Diabetic retinopathy associated with type 1 diabetes mellitus ([facility] E10.319           Plan:     Type 1 diabetes mellitus uncontrolled:  Hemoglobin A1c was 12.1% on 2-, 12% on 6-9-2020, 6.4% on 11-4-2020, 7.2% on 2-, 8.4% on 5--, 8.3% on 8-, 6.5% today. Fingerstick blood glucose is 135 mg/dL in my office today. Up to date with diabetes related annual labs: yes 2-2021  Up to date with diabetic eye exam: yes  Plan:  Reviewed patient's pump and Dexcom download with her. Overall she is doing much better but there is some postprandial hypoglycemia. Because of that I am going to change her carb ratio from 4-6 and sensitivity from 20-30. Encourage patient to continue to use control IQ. I will see her back in 3 months with labs prior to the visit. Diabetic retinopathy:  Worse in right eye than left. Getting injections in both eyes every 4 to 6 weeks. It is getting better.     Orders Placed This Encounter    LIPID PANEL     Standing Status:   Future     Standing Expiration Date:   7/78/0690    METABOLIC PANEL, COMPREHENSIVE     Standing Status:   Future     Standing Expiration Date:   5/30/2022    MICROALBUMIN, UR, RAND W/ MICROALB/CREAT RATIO     Standing Status:   Future     Standing Expiration Date:   5/30/2022    TSH RFX ON ABNORMAL TO FREE T4     Standing Status:   Future     Standing Expiration Date:   5/30/2022    AMB POC GLUCOSE BLOOD, BY GLUCOSE MONITORING DEVICE    AMB POC HEMOGLOBIN A1C    [facility] CONTINUOUS GLUCOSE MONITORING ANALYSIS I&R    insulin lispro (HumaLOG U-100 Insulin) 100 unit/mL injection     Sig: USE IN INSULIN PUMP AS DIRECTED -- MAX DAILY DOSE = 80 UNITS, 30 days     Dispense:  30 mL     Refill:  5        Signed By: Otis Birmingham MD     November 30, 2021      Return to clinic 3 months

## 2022-02-12 LAB
ALBUMIN SERPL-MCNC: 4.4 G/DL (ref 3.9–5)
ALBUMIN/CREAT UR: 19 MG/G CREAT (ref 0–29)
ALBUMIN/GLOB SERPL: 1.6 {RATIO} (ref 1.2–2.2)
ALP SERPL-CCNC: 79 IU/L (ref 44–121)
ALT SERPL-CCNC: 13 IU/L (ref 0–32)
AST SERPL-CCNC: 17 IU/L (ref 0–40)
BILIRUB SERPL-MCNC: <0.2 MG/DL (ref 0–1.2)
BUN SERPL-MCNC: 16 MG/DL (ref 6–20)
BUN/CREAT SERPL: 25 (ref 9–23)
CALCIUM SERPL-MCNC: 9.2 MG/DL (ref 8.7–10.2)
CHLORIDE SERPL-SCNC: 101 MMOL/L (ref 96–106)
CHOLEST SERPL-MCNC: 176 MG/DL (ref 100–199)
CO2 SERPL-SCNC: 24 MMOL/L (ref 20–29)
CREAT SERPL-MCNC: 0.65 MG/DL (ref 0.57–1)
CREAT UR-MCNC: 44.6 MG/DL
GLOBULIN SER CALC-MCNC: 2.7 G/DL (ref 1.5–4.5)
GLUCOSE SERPL-MCNC: 191 MG/DL (ref 65–99)
HDLC SERPL-MCNC: 59 MG/DL
LDLC SERPL CALC-MCNC: 97 MG/DL (ref 0–99)
MICROALBUMIN UR-MCNC: 8.6 UG/ML
POTASSIUM SERPL-SCNC: 4.9 MMOL/L (ref 3.5–5.2)
PROT SERPL-MCNC: 7.1 G/DL (ref 6–8.5)
SODIUM SERPL-SCNC: 138 MMOL/L (ref 134–144)
TRIGL SERPL-MCNC: 111 MG/DL (ref 0–149)
TSH SERPL DL<=0.005 MIU/L-ACNC: 1.91 UIU/ML (ref 0.45–4.5)
VLDLC SERPL CALC-MCNC: 20 MG/DL (ref 5–40)

## 2022-02-28 DIAGNOSIS — E10.65 UNCONTROLLED TYPE 1 DIABETES MELLITUS WITH HYPERGLYCEMIA (HCC): ICD-10-CM

## 2022-03-01 ENCOUNTER — OFFICE VISIT (OUTPATIENT)
Dept: ENDOCRINOLOGY | Age: 32
End: 2022-03-01
Payer: COMMERCIAL

## 2022-03-01 VITALS
HEIGHT: 65 IN | SYSTOLIC BLOOD PRESSURE: 131 MMHG | HEART RATE: 85 BPM | DIASTOLIC BLOOD PRESSURE: 86 MMHG | RESPIRATION RATE: 18 BRPM | BODY MASS INDEX: 27.04 KG/M2 | WEIGHT: 162.3 LBS | OXYGEN SATURATION: 99 %

## 2022-03-01 DIAGNOSIS — E10.9 TYPE 1 DIABETES MELLITUS WITHOUT COMPLICATION (HCC): Primary | ICD-10-CM

## 2022-03-01 DIAGNOSIS — E10.311 DIABETIC RETINOPATHY OF LEFT EYE WITH MACULAR EDEMA ASSOCIATED WITH TYPE 1 DIABETES MELLITUS, UNSPECIFIED RETINOPATHY SEVERITY (HCC): ICD-10-CM

## 2022-03-01 DIAGNOSIS — E10.65 UNCONTROLLED TYPE 1 DIABETES MELLITUS WITH HYPERGLYCEMIA (HCC): ICD-10-CM

## 2022-03-01 LAB — HBA1C MFR BLD HPLC: 6.8 %

## 2022-03-01 PROCEDURE — 83036 HEMOGLOBIN GLYCOSYLATED A1C: CPT | Performed by: INTERNAL MEDICINE

## 2022-03-01 PROCEDURE — 99214 OFFICE O/P EST MOD 30 MIN: CPT | Performed by: INTERNAL MEDICINE

## 2022-03-01 PROCEDURE — 95251 CONT GLUC MNTR ANALYSIS I&R: CPT | Performed by: INTERNAL MEDICINE

## 2022-03-01 RX ORDER — INSULIN LISPRO 100 [IU]/ML
INJECTION, SOLUTION INTRAVENOUS; SUBCUTANEOUS
Qty: 30 ML | Refills: 5 | Status: SHIPPED | OUTPATIENT
Start: 2022-03-01 | End: 2022-03-21

## 2022-03-01 NOTE — PROGRESS NOTES
History and Physical    Patient: Leo Crespo MRN: [id number]  SSN: [id number]    YOB: 1990  Age: 36 y.o. Sex: female      Subjective:      Leo Crespo is a 36 y.o. female no significant past medical history is here for follow-up of type 1 diabetes mellitus. She is in primary care of Dr. Lee Burgess. Patient was previously seeing endocrinologist Dr. Cindy Parada.    She was diagnosed with diabetes when she was 21 months old. She was initially on injections. Started using insulin pump in 2009. Patient continues to use tandem insulin pump and control IQ mode. Doing her best to enter carbs consistently and accurately. At the last visit I was noticing a lot of postprandial hypoglycemia, because of which carb ratio was changed from 4-6 and sensitivity was changed from 20-30. There is a lot of improvement since then. Sometimes there is a mismatch in carbs that she is entering and she is consuming, which is resulting in hyperglycemia, sometimes hypoglycemia is happening for the same reason but overall she is doing good. She had diabetic eye exam and she was diagnosed with diabetic retinopathy. She is getting injections in both eyes. Last injection was 11- she is going every 4 to 6 weeks. She tells me that retinopathy is getting better and they are trying to space out her appointments now to every 8 weeks. Glucometer reading: Insulin pump and Dexcom was downloaded and reviewed with patient. This will be scanned into patient's chart.     Updated diabetes history:  · Diagnosis: 21 months old    · Current treatment: pump since 2009    · Past treatment: injections novolog, lantus, humalog     · Glucose checks: yes 200-400s    · Hyperglycemia: yes    · Hypoglycemia: no    · Meals per day: 3, carb counting, 3 meals + a snacks at bedtime,     · Exercise: walks     · DM related hospitalizations: no,         Complications of DM:    · CAD: no    · CVA: no    · PVD: no    · Amputations: no     · Retinopathy: yes bilateral, s/p injections in right; last exam was 8-4-2021    · Gastropathy: no    · Nephropathy: no    · Neuropathy: no        Medications:    · Statin: NA    · ACE-I: NA    · ASA: NA        · Diabetes education: long time back   Pump     Pump : Tandem T slim    Insulin type Humalog    Insulin Time: 5 hours    Insulin Pump Settings    Basal rate:  Midnight to 2 AM: 1.8 --> 2  2 AM to 9 AM: 1.6 --> 1.8  9 AM to midnight: 1.8 --> 2    Carb ratio 1: 6    Insulin sensitivity: 30    Blood glucose target: 110  Past Medical History:   Diagnosis Date    Diabetic retinal damage of right eye ([facility] [address]     Type 1 diabetes mellitus, uncontrolled ([facility] [address]      Past Surgical History:   Procedure Laterality Date    HX CYST REMOVAL N/A 02/09/2020    Patient had a cyst removal 02/09/2020    HX WISDOM TEETH EXTRACTION        Family History   Problem Relation Age of Onset    Diabetes Mother     Hypertension Mother     Diabetes Maternal Grandmother     Diabetes Maternal Grandfather     Diabetes Maternal Uncle     Hypertension Paternal Grandfather      Social History     Tobacco Use    Smoking status: Never Smoker    Smokeless tobacco: Never Used   Substance Use Topics    Alcohol use: Not Currently     Comment: occ      Prior to Admission medications    Medication Sig Start Date End Date Taking? Authorizing Provider   insulin lispro (HumaLOG U-100 Insulin) 100 unit/mL injection USE IN INSULIN PUMP AS DIRECTED -- MAX DAILY DOSE = 80 UNITS, 30 days 3/1/22  Yes Gwen Moyer MD   glucose blood VI test strips (FREESTYLE LITE STRIPS) strip Test up to 3 times a day Dx Code E10.65 2/9/16  Yes Briseida Fowler MD   Lancets (FREESTYLE LANCETS) misc Test up to 3 times daily.  Dx code E10.65 2/9/16  Yes Briseida Fowler MD   Blood-Glucose Meter (FREESTYLE LITE METER) monitoring kit To test daily Dx Code 250.00 8/6/15  Yes Briseida Fowler MD        Allergies   Allergen Reactions    Broccoli Nausea and Vomiting       Review of Systems:  ROS    A comprehensive review of systems was preformed and it is negative except mentioned in HPI    Objective:     Vitals:    03/01/22 1514   BP: 131/86   Pulse: 85   Resp: 18   SpO2: 99%   Weight: 162 lb 4.8 oz (73.6 kg)   Height: 5' 5\" (1.651 m)        Physical Exam:    Physical Exam  Vitals and nursing note reviewed. Constitutional:       Appearance: Normal appearance. HENT:      Head: Normocephalic and atraumatic. Eyes:      Extraocular Movements: Extraocular movements intact. Cardiovascular:      Rate and Rhythm: Normal rate and regular rhythm. Pulmonary:      Effort: Pulmonary effort is normal.      Breath sounds: Normal breath sounds. Neurological:      General: No focal deficit present. Mental Status: She is alert. Psychiatric:         Mood and Affect: Mood normal.         Behavior: Behavior normal.         Thought Content: Thought content normal.         Judgment: Judgment normal.     2-:  diabetic foot exam:  Bilateral diabetic foot exam was performed today. Dorsalis pedis pulses 2+ bilaterally. Monofilament sensation normal bilaterally. No ulcers or skin breakdown. Procedure note:  CGM download: Dexcom was downloaded from 2-3-1-2022  CGMS TRACING PATTERNS    Overnight Trends (the most consistent pattern): Most consistent pattern is euglycemia       prandial trends: Usually euglycemia but sometimes there is postprandial hyperglycemia, rarely postprandial hypoglycemia    Additional Comments  See CGMS download and CGMS Patient Log in     CGMS RECOMMENDATIONS    Medication Changes: Advised patient to enter carbs consistently    Labs and Imaging:  Results for Bigg Martinez (MRN [id number]) as of 2/24/2021 16:45   Ref.  Range 2/18/2021 10:41   Sodium Latest Ref Range: 134 - 144 mmol/L 142   Potassium Latest Ref Range: 3.5 - 5.2 mmol/L 4.4   Chloride Latest Ref Range: 96 - 106 mmol/L 105   CO2 Latest Ref Range: 20 - 29 mmol/L 23   Glucose Latest Ref Range: 65 - 99 mg/dL 62 (L)   BUN Latest Ref Range: 6 - 20 mg/dL 12   Creatinine Latest Ref Range: 0.57 - 1.00 mg/dL 0.69   BUN/Creatinine ratio Latest Ref Range: 9 - 23  17   Calcium Latest Ref Range: 8.7 - 10.2 mg/dL 9.1   GFR est non-AA Latest Ref Range: >59 mL/min/1.73 119   GFR est AA Latest Ref Range: >59 mL/min/1.73 137   Bilirubin, total Latest Ref Range: 0.0 - 1.2 mg/dL 0.2   Protein, total Latest Ref Range: 6.0 - 8.5 g/dL 7.1   Albumin Latest Ref Range: 3.9 - 5.0 g/dL 4.6   A-G Ratio Latest Ref Range: 1.2 - 2.2  1.8   ALT Latest Ref Range: 0 - 32 IU/L 12   AST Latest Ref Range: 0 - 40 IU/L 16   Alk. phosphatase Latest Ref Range: 39 - 117 IU/L 91   Triglyceride Latest Ref Range: 0 - 149 mg/dL 69   Cholesterol, total Latest Ref Range: 100 - 199 mg/dL 156   HDL Cholesterol Latest Ref Range: >39 mg/dL 56   VLDL, calculated Latest Ref Range: 5 - 40 mg/dL 14   LDL, calculated Latest Ref Range: 0 - 99 mg/dL 86   Results for Karo Grover (MRN [id number]) as of 2/24/2021 17:02   Ref. Range 2/18/2021 10:41   Creatinine, urine Latest Ref Range: Not Estab. mg/dL 160.2   Microalbumin, urine Latest Ref Range: Not Estab. ug/mL 16.4   Microalbumin/Creat.  Ratio Latest Ref Range: 0 - 29 mg/g creat 10   TSH Latest Ref Range: 0.450 - 4.500 uIU/mL 2.130     Last 3 Recorded Weights in this Encounter    03/01/22 1514   Weight: 162 lb 4.8 oz (73.6 kg)        Lab Results   Component Value Date/Time    Hemoglobin A1c, External 9.9 01/12/2016 12:00 AM    Hemoglobin A1c, External 9.6 04/29/2015 08:26 AM        Assessment:     Patient Active Problem List   Diagnosis Code    Type I (juvenile type) diabetes mellitus without mention of complication, uncontrolled E10.65    Hypoglycemia E16.2    Hyperglycemia due to type 1 diabetes mellitus ([facility] [address] E10.65    Encounter for long-term (current) use of insulin ([facility]) Z79.4    Insulin pump titration Z46.81    Uncontrolled type 1 diabetes mellitus with hyperglycemia ([facility] E10.65    Diabetic retinopathy associated with type 1 diabetes mellitus ([facility] E10.319           Plan:     Type 1 diabetes mellitus uncontrolled:  Hemoglobin A1c was 12.1% on 2-, 12% on 6-9-2020, 6.4% on 11-4-2020, 7.2% on 2-, 8.4% on 5--, 8.3% on 8-, 6.5% on 11-, 6.8% today. Up to date with diabetes related annual labs: yes 2-2022  Up to date with diabetic eye exam: yes  Plan:  Reviewed patient's pump and Dexcom download with her. Glucose control is looking adequate, frequency of hypoglycemia has decreased a lot. Sometimes she has postprandial hyperglycemia, probably related to mismatch between carbs entered in carbs consumed. No changes today. Advised patient to continue to work on entering [employer] to the best of her ability. I will see her back in 3 months. Diabetic retinopathy:  Worse in right eye than left. Getting injections in both eyes every 8 weeks.     Orders Placed This Encounter    AMB POC HEMOGLOBIN A1C    [place] CONTINUOUS GLUCOSE MONITORING ANALYSIS I&R    insulin lispro (HumaLOG U-100 Insulin) 100 unit/mL injection     Sig: USE IN INSULIN PUMP AS DIRECTED -- MAX DAILY DOSE = 80 UNITS, 30 days     Dispense:  30 mL     Refill:  5        Signed By: Gal Barriga MD     March 1, 2022      Return to clinic 3 months

## 2022-03-18 PROBLEM — E10.65 UNCONTROLLED TYPE 1 DIABETES MELLITUS WITH HYPERGLYCEMIA (HCC): Status: ACTIVE | Noted: 2021-02-24

## 2022-03-19 DIAGNOSIS — E10.65 UNCONTROLLED TYPE 1 DIABETES MELLITUS WITH HYPERGLYCEMIA (HCC): ICD-10-CM

## 2022-03-19 PROBLEM — E10.319 DIABETIC RETINOPATHY ASSOCIATED WITH TYPE 1 DIABETES MELLITUS (HCC): Status: ACTIVE | Noted: 2021-05-26

## 2022-03-19 PROCEDURE — 3044F HG A1C LEVEL LT 7.0%: CPT | Performed by: INTERNAL MEDICINE

## 2022-03-21 RX ORDER — INSULIN LISPRO 100 [IU]/ML
INJECTION, SOLUTION INTRAVENOUS; SUBCUTANEOUS
Qty: 30 ML | Refills: 5 | Status: SHIPPED | OUTPATIENT
Start: 2022-03-21 | End: 2022-06-07 | Stop reason: SDUPTHER

## 2022-06-07 ENCOUNTER — TELEPHONE (OUTPATIENT)
Dept: ENDOCRINOLOGY | Age: 32
End: 2022-06-07

## 2022-06-07 ENCOUNTER — OFFICE VISIT (OUTPATIENT)
Dept: ENDOCRINOLOGY | Age: 32
End: 2022-06-07
Payer: COMMERCIAL

## 2022-06-07 VITALS
HEIGHT: 65 IN | OXYGEN SATURATION: 97 % | BODY MASS INDEX: 26.11 KG/M2 | TEMPERATURE: 98.1 F | SYSTOLIC BLOOD PRESSURE: 109 MMHG | WEIGHT: 156.7 LBS | DIASTOLIC BLOOD PRESSURE: 72 MMHG | HEART RATE: 98 BPM | RESPIRATION RATE: 18 BRPM

## 2022-06-07 DIAGNOSIS — E10.311 DIABETIC RETINOPATHY OF LEFT EYE WITH MACULAR EDEMA ASSOCIATED WITH TYPE 1 DIABETES MELLITUS, UNSPECIFIED RETINOPATHY SEVERITY (HCC): ICD-10-CM

## 2022-06-07 DIAGNOSIS — E10.65 UNCONTROLLED TYPE 1 DIABETES MELLITUS WITH HYPERGLYCEMIA (HCC): Primary | ICD-10-CM

## 2022-06-07 LAB
GLUCOSE POC: 146 MG/DL
HBA1C MFR BLD HPLC: 8.9 %

## 2022-06-07 PROCEDURE — 99214 OFFICE O/P EST MOD 30 MIN: CPT | Performed by: INTERNAL MEDICINE

## 2022-06-07 PROCEDURE — 3052F HG A1C>EQUAL 8.0%<EQUAL 9.0%: CPT | Performed by: INTERNAL MEDICINE

## 2022-06-07 PROCEDURE — 83036 HEMOGLOBIN GLYCOSYLATED A1C: CPT | Performed by: INTERNAL MEDICINE

## 2022-06-07 PROCEDURE — 82962 GLUCOSE BLOOD TEST: CPT | Performed by: INTERNAL MEDICINE

## 2022-06-07 RX ORDER — INSULIN LISPRO 100 [IU]/ML
INJECTION, SOLUTION INTRAVENOUS; SUBCUTANEOUS
Qty: 90 ML | Refills: 3 | Status: SHIPPED | OUTPATIENT
Start: 2022-06-07

## 2022-06-07 NOTE — LETTER
6/7/2022    Patient: Juana Vasquez   YOB: 1990   Date of Visit: 6/7/2022     Merlinda Spencer, MD  [address] [place] [id number]  Via Fax: [phone]    Dear Merlinda Spencer, MD,      Thank you for referring Ms. Jerry De La Cruz to [address] [place] [address] for evaluation. My notes for this consultation are attached. If you have questions, please do not hesitate to call me. I look forward to following your patient along with you.       Sincerely,    India Galloway MD

## 2022-06-07 NOTE — PROGRESS NOTES
History and Physical    Patient: Rhonda Aquino MRN: [id number]  SSN: [id number]    YOB: 1990  Age: 29 y.o. Sex: female      Subjective:      Rhonda Aquino is a 29 y.o. female no significant past medical history is here for follow-up of type 1 diabetes mellitus. She is in primary care of Dr. Dave Dodge. Patient was previously seeing endocrinologist Dr. Ruperto Romo.    She was diagnosed with diabetes when she was 21 months old. She was initially on injections. Started using insulin pump in 2009. Patient says she ran out of [employer] end of March 2022. Since then she has been contacting [facility] [address] but I have not received any order to sign from them. Not sure what is the issue. She checks blood glucose maybe once a day, hardly entering glucose in her pump. Tries to enter carbs when she eats. She had diabetic eye exam and she was diagnosed with diabetic retinopathy. She is getting injections in both eyes. Last injection was 11- she is going every 4 to 6 weeks. She tells me that retinopathy is getting better and they are trying to space out her appointments now to every 8 weeks.     Glucometer reading: Insulin pump was downloaded and reviewed with patient    Updated diabetes history:  · Diagnosis: 25 months old    · Current treatment: pump since 2009    · Past treatment: injections novolog, lantus, humalog     · Glucose checks: yes 200-400s    · Hyperglycemia: yes    · Hypoglycemia: no    · Meals per day: 3, carb counting, 3 meals + a snacks at bedtime,     · Exercise: walks     · DM related hospitalizations: no,         Complications of DM:    · CAD: no    · CVA: no    · PVD: no    · Amputations: no     · Retinopathy: yes bilateral, s/p injections in right; last exam was 8-4-2021    · Gastropathy: no    · Nephropathy: no    · Neuropathy: no        Medications:    · Statin: NA    · ACE-I: NA    · ASA: NA        · Diabetes education: long time back   Pump     Pump : Tandem T slim    Insulin type Humalog    Insulin Time: 5 hours    Insulin Pump Settings    Basal rate:  Midnight to 2 AM: 1.8 --> 2  2 AM to 9 AM: 1.6 --> 1.8  9 AM to midnight: 1.8 --> 2    Carb ratio 1: 6    Insulin sensitivity: 30    Blood glucose target: 110  Past Medical History:   Diagnosis Date    Diabetic retinal damage of right eye ([address]     Type 1 diabetes mellitus, uncontrolled      Past Surgical History:   Procedure Laterality Date    HX CYST REMOVAL N/A 02/09/2020    Patient had a cyst removal 02/09/2020    HX WISDOM TEETH EXTRACTION        Family History   Problem Relation Age of Onset    Diabetes Mother     Hypertension Mother     Diabetes Maternal Grandmother     Diabetes Maternal Grandfather     Diabetes Maternal Uncle     Hypertension Paternal Grandfather     Cancer Paternal Grandfather      Social History     Tobacco Use    Smoking status: Never Smoker    Smokeless tobacco: Never Used   Substance Use Topics    Alcohol use: Not Currently     Comment: occ      Prior to Admission medications    Medication Sig Start Date End Date Taking? Authorizing Provider   insulin lispro (HUMALOG) 100 unit/mL injection USE IN INSULIN PUMP AS DIRECTED. MAX DAILY DOSE IS 80 UNITS, 90 days 6/7/22  Yes Anatoliy Allison MD   glucose blood VI test strips (FREESTYLE LITE STRIPS) strip Test up to 3 times a day Dx Code E10.65 2/9/16  Yes Anna Correa MD   Blood-Glucose Meter (FREESTYLE LITE METER) monitoring kit To test daily Dx Code 250.00 8/6/15  Yes Anna Corrae MD   Lancets (FREESTYLE LANCETS) misc Test up to 3 times daily.  Dx code E10.65  Patient not taking: Reported on 6/7/2022 2/9/16   Anna Correa MD        Allergies   Allergen Reactions    Broccoli Nausea and Vomiting       Review of Systems:  ROS    A comprehensive review of systems was preformed and it is negative except mentioned in HPI    Objective:     Vitals:    06/07/22 1510   BP: 109/72   Pulse: 98   Resp: 18   Temp: 98.1 °F (36.7 °C)   TempSrc: Oral   SpO2: 97%   Weight: 156 lb 11.2 oz (71.1 kg)   Height: 5' 5\" (1.651 m)        Physical Exam:    Physical Exam  Vitals and nursing note reviewed. Constitutional:       Appearance: Normal appearance. HENT:      Head: Normocephalic and atraumatic. Eyes:      Extraocular Movements: Extraocular movements intact. Cardiovascular:      Rate and Rhythm: Normal rate and regular rhythm. Pulmonary:      Effort: Pulmonary effort is normal.      Breath sounds: Normal breath sounds. Neurological:      General: No focal deficit present. Mental Status: She is alert. Psychiatric:         Mood and Affect: Mood normal.         Behavior: Behavior normal.         Thought Content: Thought content normal.         Judgment: Judgment normal.       diabetic foot exam:  Bilateral diabetic foot exam was performed today. Dorsalis pedis pulses 2+ bilaterally. Monofilament sensation normal bilaterally. No ulcers or skin breakdown. Labs and Imaging:  Results for Tiana Torres (MRN [id number]) as of 2/24/2021 16:45   Ref. Range 2/18/2021 10:41   Sodium Latest Ref Range: 134 - 144 mmol/L 142   Potassium Latest Ref Range: 3.5 - 5.2 mmol/L 4.4   Chloride Latest Ref Range: 96 - 106 mmol/L 105   CO2 Latest Ref Range: 20 - 29 mmol/L 23   Glucose Latest Ref Range: 65 - 99 mg/dL 62 (L)   BUN Latest Ref Range: 6 - 20 mg/dL 12   Creatinine Latest Ref Range: 0.57 - 1.00 mg/dL 0.69   BUN/Creatinine ratio Latest Ref Range: 9 - 23  17   Calcium Latest Ref Range: 8.7 - 10.2 mg/dL 9.1   GFR est non-AA Latest Ref Range: >59 mL/min/1.73 119   GFR est AA Latest Ref Range: >59 mL/min/1.73 137   Bilirubin, total Latest Ref Range: 0.0 - 1.2 mg/dL 0.2   Protein, total Latest Ref Range: 6.0 - 8.5 g/dL 7.1   Albumin Latest Ref Range: 3.9 - 5.0 g/dL 4.6   A-G Ratio Latest Ref Range: 1.2 - 2.2  1.8   ALT Latest Ref Range: 0 - 32 IU/L 12   AST Latest Ref Range: 0 - 40 IU/L 16   Alk.  phosphatase Latest Ref Range: 39 - 117 IU/L 91   Triglyceride Latest Ref Range: 0 - 149 mg/dL 69   Cholesterol, total Latest Ref Range: 100 - 199 mg/dL 156   HDL Cholesterol Latest Ref Range: >39 mg/dL 56   VLDL, calculated Latest Ref Range: 5 - 40 mg/dL 14   LDL, calculated Latest Ref Range: 0 - 99 mg/dL 86   Results for Seth Suarez (MRN [id number]) as of 2/24/2021 17:02   Ref. Range 2/18/2021 10:41   Creatinine, urine Latest Ref Range: Not Estab. mg/dL 160.2   Microalbumin, urine Latest Ref Range: Not Estab. ug/mL 16.4   Microalbumin/Creat. Ratio Latest Ref Range: 0 - 29 mg/g creat 10   TSH Latest Ref Range: 0.450 - 4.500 uIU/mL 2.130     Last 3 Recorded Weights in this Encounter    06/07/22 1510   Weight: 156 lb 11.2 oz (71.1 kg)        Lab Results   Component Value Date/Time    Hemoglobin A1c, External 9.9 01/12/2016 12:00 AM    Hemoglobin A1c, External 9.6 04/29/2015 08:26 AM        Assessment:     Patient Active Problem List   Diagnosis Code    Type I (juvenile type) diabetes mellitus without mention of complication, uncontrolled [id number]    Hypoglycemia E16.2    Hyperglycemia due to type 1 diabetes mellitus ([facility] E10.65    Encounter for long-term (current) use of insulin ([facility] Z79.4    Insulin pump titration Z46.81    Uncontrolled type 1 diabetes mellitus with hyperglycemia ([facility] E10.65    Diabetic retinopathy associated with type 1 diabetes mellitus ([facility] E10.319           Plan:     Type 1 diabetes mellitus uncontrolled:  Hemoglobin A1c was 12.1% on 2-, 12% on 6-9-2020, 6.4% on 11-4-2020, 7.2% on 2-, 8.4% on 5--, 8.3% on 8--, 6.5% on 11-, 6.8% on 3-1-2022, 8.9% today. Fingerstick blood glucose 146 mg/dL in my office today. Up to date with diabetes related annual labs: yes 2-2022  Up to date with diabetic eye exam: yes  Plan:  Glucose control has worsened as patient ran out of CGM so she is not able to use insulin pump in control IQ system.   Encourage patient to check blood glucose 3 times a day with a regular meter and enter glucose readings in her pump. Gave patient fax number to give to [address] so they can send forms to be signed. Sending insulin refill to pharmacy. Diabetic retinopathy:  Worse in right eye than left. Getting injections in both eyes every 8 weeks. Orders Placed This Encounter    AMB POC GLUCOSE BLOOD, BY GLUCOSE MONITORING DEVICE    AMB POC HEMOGLOBIN A1C    insulin lispro (HUMALOG) 100 unit/mL injection     Sig: USE IN INSULIN PUMP AS DIRECTED.  MAX DAILY DOSE IS 80 UNITS, 90 days     Dispense:  90 mL     Refill:  3        Signed By: Jazmine Sullivan MD     June 7, 2022      Return to clinic

## 2022-06-07 NOTE — TELEPHONE ENCOUNTER
Patient has not been able to get Dexcom from [facility] since past 3 months or so. We did not receive anything from [facility]. Can you please call them and check.

## 2022-11-08 ENCOUNTER — OFFICE VISIT (OUTPATIENT)
Dept: INTERNAL MEDICINE CLINIC | Age: 32
End: 2022-11-08
Payer: COMMERCIAL

## 2022-11-08 VITALS
DIASTOLIC BLOOD PRESSURE: 84 MMHG | RESPIRATION RATE: 19 BRPM | HEART RATE: 115 BPM | HEIGHT: 65 IN | WEIGHT: 147 LBS | SYSTOLIC BLOOD PRESSURE: 124 MMHG | BODY MASS INDEX: 24.49 KG/M2

## 2022-11-08 DIAGNOSIS — E10.65 UNCONTROLLED TYPE 1 DIABETES MELLITUS WITH HYPERGLYCEMIA (HCC): Primary | ICD-10-CM

## 2022-11-08 DIAGNOSIS — E16.2 HYPOGLYCEMIA: ICD-10-CM

## 2022-11-08 DIAGNOSIS — E10.311 DIABETIC RETINOPATHY OF LEFT EYE WITH MACULAR EDEMA ASSOCIATED WITH TYPE 1 DIABETES MELLITUS, UNSPECIFIED RETINOPATHY SEVERITY (HCC): ICD-10-CM

## 2022-11-08 LAB
GLUCOSE POC: 139 MG/DL
HBA1C MFR BLD HPLC: 7.9 %

## 2022-11-08 PROCEDURE — 82962 GLUCOSE BLOOD TEST: CPT | Performed by: INTERNAL MEDICINE

## 2022-11-08 PROCEDURE — 83036 HEMOGLOBIN GLYCOSYLATED A1C: CPT | Performed by: INTERNAL MEDICINE

## 2022-11-08 PROCEDURE — 99204 OFFICE O/P NEW MOD 45 MIN: CPT | Performed by: INTERNAL MEDICINE

## 2022-11-08 RX ORDER — INSULIN LISPRO 100 [IU]/ML
INJECTION, SOLUTION INTRAVENOUS; SUBCUTANEOUS
Qty: 90 ML | Refills: 3
Start: 2022-11-08

## 2022-11-08 NOTE — PROGRESS NOTES
Larry Kingsley is a 29 y.o. female and presents with Establish Care and Diabetes    Stacey was diagnosed at 21 months old with Diabetes, she's currently on insulin pump. She says she had an issue with herpump site yesterday but she changed it from site and he's fine now, during this issue her glucose went up to 575. She says she's feeling a little sluggish. She's curerntly ussing Tandem T slim pump   Her pump settings are:  -Basal rate:  Midnight to 2 am: 2 unit/hr  2 am to 9 am: 1.8 units/hr  9 am to midnight: 2 unit/hr  Glucometer as she does not have a cgm: episode of hypoglycemia at 5 am or 8 pm more or less 2 of 60, , 70 at 1:30 pm, 58 5:49 pm49 9:05 pm36 9 am today . All others anywhere from 168 to 441, yesterday in the 500s due to site failure. She says she can not afford the dexcom, right now she's using a regular glucometer   She has diabetic retinopathy, has received injections, at [facility], she thinks it's Dr. Josette Blair     She works at a [employer]:  Negative for chills, fatigue, fever and unexpected weight change. HENT:  Negative for congestion, ear pain, sneezing and sore throat. Eyes:  Negative for pain and discharge. Respiratory:  Negative for cough, shortness of breath and wheezing. Cardiovascular:  Negative for chest pain, palpitations and leg swelling. Gastrointestinal:  Negative for abdominal pain, blood in stool, constipation and diarrhea. Endocrine: Negative for polydipsia and polyuria. Genitourinary:  Negative for difficulty urinating, dysuria, frequency, hematuria and urgency. Musculoskeletal:  Negative for arthralgias, back pain and joint swelling. Skin:  Negative for rash. Allergic/Immunologic: Negative for environmental allergies and food allergies. Neurological:  Negative for dizziness, speech difficulty, weakness, light-headedness, numbness and headaches.    Hematological:  Negative for adenopathy. Psychiatric/Behavioral:  Negative for behavioral problems (Depression), sleep disturbance and suicidal ideas. Past Medical History:   Diagnosis Date    Diabetic retinal damage of right eye ([address]     Type 1 diabetes mellitus, uncontrolled      Past Surgical History:   Procedure Laterality Date    HX CYST REMOVAL N/A 02/09/2020    Patient had a cyst removal 02/09/2020    HX WISDOM TEETH EXTRACTION       Social History     Socioeconomic History    Marital status: SINGLE   Occupational History    Occupation: Employed   Tobacco Use    Smoking status: Never    Smokeless tobacco: Never   Vaping Use    Vaping Use: Never used   Substance and Sexual Activity    Alcohol use: Yes     Comment: occ    Drug use: No    Sexual activity: Not Currently     Partners: Male     Family History   Problem Relation Age of Onset    Heart Disease Mother     Diabetes Mother     Hypertension Mother     Heart Disease Father     Heart Disease Brother     Diabetes Maternal Uncle     Diabetes Maternal Grandmother     Diabetes Maternal Grandfather     Hypertension Paternal Grandfather     Cancer Paternal Grandfather      Current Outpatient Medications   Medication Sig Dispense Refill    insulin lispro (HUMALOG) 100 unit/mL injection USE IN INSULIN PUMP AS DIRECTED. MAX DAILY DOSE IS 80 UNITS, 90 days 90 mL 3    glucose blood VI test strips (FREESTYLE LITE STRIPS) strip Test up to 3 times a day Dx Code E10.65 300 Strip 4    Blood-Glucose Meter (FREESTYLE LITE METER) monitoring kit To test daily Dx Code 250.00 1 Kit 0    Lancets (FREESTYLE LANCETS) misc Test up to 3 times daily.  Dx code E10.65 (Patient not taking: No sig reported) 300 Each 4     Allergies   Allergen Reactions    Broccoli Nausea and Vomiting       Objective:  Visit Vitals  /84 (BP 1 Location: Left upper arm, BP Patient Position: Sitting, BP Cuff Size: Adult)   Pulse (!) 115   Resp 19   Ht 5' 5\" (1.651 m)   Wt 147 lb (66.7 kg)   LMP 10/31/2022   BMI 24.46 kg/m²     Physical Exam:   Physical Exam  Constitutional:       General: She is not in acute distress. Appearance: Normal appearance. HENT:      Head: Normocephalic and atraumatic. Mouth/Throat:      Mouth: Mucous membranes are moist.   Eyes:      Extraocular Movements: Extraocular movements intact. Conjunctiva/sclera: Conjunctivae normal.      Pupils: Pupils are equal, round, and reactive to light. Cardiovascular:      Rate and Rhythm: Normal rate and regular rhythm. Pulses: Normal pulses. Heart sounds: Normal heart sounds. Pulmonary:      Effort: Pulmonary effort is normal.      Breath sounds: Normal breath sounds. Abdominal:      General: Abdomen is flat. Bowel sounds are normal. There is no distension. Palpations: Abdomen is soft. There is no mass. Tenderness: There is no abdominal tenderness. Musculoskeletal:         General: No swelling or deformity. Cervical back: Normal range of motion and neck supple. Right lower leg: No edema. Left lower leg: No edema. Lymphadenopathy:      Cervical: No cervical adenopathy. Skin:     General: Skin is warm and dry. Capillary Refill: Capillary refill takes less than 2 seconds. Coloration: Skin is not jaundiced or pale. Findings: No erythema or rash. Neurological:      General: No focal deficit present. Mental Status: She is alert and oriented to person, place, and time. Psychiatric:         Mood and Affect: Mood normal.         Behavior: Behavior normal.         Thought Content:  Thought content normal.         Judgment: Judgment normal.        Results for orders placed or performed in visit on 11/08/22   AMB POC HEMOGLOBIN A1C   Result Value Ref Range    Hemoglobin A1c (POC) 7.9 %   AMB POC GLUCOSE BLOOD, BY GLUCOSE MONITORING DEVICE   Result Value Ref Range    Glucose  MG/DL       Assessment/Plan:    A1C went down from 8.9 to 7.9, she needs to see endocrinology for management of insulin pump. I will get in touch with dexcom rep so we can help her obtain the dexcom. ICD-10-CM ICD-9-CM    1. Uncontrolled type 1 diabetes mellitus with hyperglycemia (HCC)  E10.65 250.83 AMB POC HEMOGLOBIN A1C     790.29 AMB POC GLUCOSE BLOOD, BY GLUCOSE MONITORING DEVICE      REFERRAL TO ENDOCRINOLOGY      insulin lispro (HUMALOG) 100 unit/mL injection      METABOLIC PANEL, BASIC      2. Hypoglycemia  E16.2 251.2       3. Diabetic retinopathy of left eye with macular edema associated with type 1 diabetes mellitus, unspecified retinopathy severity ([address]  E10.311 250.51      362.07      362.01         Orders Placed This Encounter    METABOLIC PANEL, BASIC    REFERRAL TO ENDOCRINOLOGY     Referral Priority:   Routine     Referral Type:   Consultation     Referral Reason:   Specialty Services Required     Referred to Provider:   Venancio Jeans, MD     Number of Visits Requested:   1    AMB POC HEMOGLOBIN A1C    AMB POC GLUCOSE BLOOD, BY GLUCOSE MONITORING DEVICE    insulin lispro (HUMALOG) 100 unit/mL injection     Sig: USE IN INSULIN PUMP AS DIRECTED. MAX DAILY DOSE IS 80 UNITS, 90 days     Dispense:  90 mL     Refill:  3     follow low fat diet, follow low salt diet, routine labs ordered, call if any problems, bring glucose logs   There are no Patient Instructions on file for this visit. Follow-up and Dispositions    Return in about 3 months (around 2/8/2023).

## 2022-11-08 NOTE — PROGRESS NOTES
1. \"Have you been to the ER, urgent care clinic since your last visit? Hospitalized since your last visit? \" No    2. \"Have you seen or consulted any other health care providers outside of the [address] [place] [address] since your last visit? \" No     3. For patients aged 39-70: Has the patient had a colonoscopy / FIT/ Cologuard? NA - based on age      If the patient is female:    4. For patients aged 41-77: Has the patient had a mammogram within the past 2 years? NA - based on age or sex      11. For patients aged 21-65: Has the patient had a pap smear?  No      Chief Complaint   Patient presents with    [facility]    Diabetes        Visit Vitals  /84 (BP 1 Location: Left upper arm, BP Patient Position: Sitting, BP Cuff Size: Adult)   Pulse (!) 115   Resp 19   Ht 5' 5\" (1.651 m)   Wt 147 lb (66.7 kg)   LMP 10/31/2022   BMI 24.46 kg/m²

## 2023-02-08 ENCOUNTER — TELEPHONE (OUTPATIENT)
Dept: INTERNAL MEDICINE CLINIC | Age: 33
End: 2023-02-08

## 2023-02-08 ENCOUNTER — OFFICE VISIT (OUTPATIENT)
Dept: INTERNAL MEDICINE CLINIC | Age: 33
End: 2023-02-08
Payer: COMMERCIAL

## 2023-02-08 VITALS
WEIGHT: 161 LBS | TEMPERATURE: 98 F | OXYGEN SATURATION: 97 % | BODY MASS INDEX: 26.79 KG/M2 | SYSTOLIC BLOOD PRESSURE: 130 MMHG | HEART RATE: 87 BPM | DIASTOLIC BLOOD PRESSURE: 77 MMHG

## 2023-02-08 DIAGNOSIS — M25.561 CHRONIC PAIN OF RIGHT KNEE: Primary | ICD-10-CM

## 2023-02-08 DIAGNOSIS — G89.29 CHRONIC PAIN OF RIGHT KNEE: Primary | ICD-10-CM

## 2023-02-08 DIAGNOSIS — E10.65 UNCONTROLLED TYPE 1 DIABETES MELLITUS WITH HYPERGLYCEMIA (HCC): ICD-10-CM

## 2023-02-08 DIAGNOSIS — Z11.59 NEED FOR HEPATITIS C SCREENING TEST: ICD-10-CM

## 2023-02-08 DIAGNOSIS — E10.311 DIABETIC RETINOPATHY OF LEFT EYE WITH MACULAR EDEMA ASSOCIATED WITH TYPE 1 DIABETES MELLITUS, UNSPECIFIED RETINOPATHY SEVERITY (HCC): ICD-10-CM

## 2023-02-08 DIAGNOSIS — M79.661 RIGHT CALF PAIN: ICD-10-CM

## 2023-02-08 PROCEDURE — 99214 OFFICE O/P EST MOD 30 MIN: CPT | Performed by: INTERNAL MEDICINE

## 2023-02-08 RX ORDER — BLOOD-GLUCOSE SENSOR
EACH MISCELLANEOUS
COMMUNITY
Start: 2023-01-24

## 2023-02-08 RX ORDER — BLOOD-GLUCOSE TRANSMITTER
EACH MISCELLANEOUS
COMMUNITY
Start: 2023-01-31

## 2023-02-08 NOTE — PROGRESS NOTES
Hubert Still is a 29 y.o. female and presents with Diabetes (Hem A1C=7. 9. Jan01/2023)    Sanaz Vegas is having pain in her right lef she says it has been there since she was 4 y/o. Comes and goes she says it's sharp around 4/19, happens intermittently, denies numbness and tingling, she says she has been told always that it's her diabetes and tocontrol it and it will pass, those are her words. She is seeing now Endocrinology Dr. Belgica Shay, she's on dexcom, she's on the insulin pump, last visit with her was in January 24th, she says A1C was 7.9 and she had some adjustments on her insulin pump. Review of Systems  Review of Systems   Constitutional:  Negative for chills, fatigue, fever and unexpected weight change. HENT:  Negative for congestion, ear pain, sneezing and sore throat. Eyes:  Negative for pain and discharge. Respiratory:  Negative for cough, shortness of breath and wheezing. Cardiovascular:  Negative for chest pain, palpitations and leg swelling. Gastrointestinal:  Negative for abdominal pain, blood in stool, constipation and diarrhea. Endocrine: Negative for polydipsia and polyuria. Genitourinary:  Negative for difficulty urinating, dysuria, frequency, hematuria and urgency. Musculoskeletal:  Negative for arthralgias, back pain and joint swelling. Skin:  Negative for rash. Allergic/Immunologic: Negative for environmental allergies and food allergies. Neurological:  Negative for dizziness, speech difficulty, weakness, light-headedness, numbness and headaches. Hematological:  Negative for adenopathy. Psychiatric/Behavioral:  Negative for behavioral problems (Depression), sleep disturbance and suicidal ideas.          Past Medical History:   Diagnosis Date    Diabetic retinal damage of right eye ([facility] [address]     Type 1 diabetes mellitus, uncontrolled      Past Surgical History:   Procedure Laterality Date    HX CYST REMOVAL N/A 02/09/2020    Patient had a cyst removal 02/09/2020 HX WISDOM TEETH EXTRACTION       Social History     Socioeconomic History    Marital status: SINGLE   Occupational History    Occupation: Employed   Tobacco Use    Smoking status: Never    Smokeless tobacco: Never   Vaping Use    Vaping Use: Never used   Substance and Sexual Activity    Alcohol use: Yes     Comment: occ    Drug use: No    Sexual activity: Not Currently     Partners: Male     Family History   Problem Relation Age of Onset    Heart Disease Mother     Diabetes Mother     Hypertension Mother     Heart Disease Father     Heart Disease Brother     Diabetes Maternal Uncle     Diabetes Maternal Grandmother     Diabetes Maternal Grandfather     Hypertension Paternal Grandfather     Cancer Paternal Grandfather      Current Outpatient Medications   Medication Sig Dispense Refill    Dexcom G6 Transmitter dionisio AS DIRECTED EVERY 90 DAYS      Dexcom G6 Sensor dionisio AS DIRECTED EVERY 10 DAYS      insulin lispro (HUMALOG) 100 unit/mL injection USE IN INSULIN PUMP AS DIRECTED. MAX DAILY DOSE IS 80 UNITS, 90 days 90 mL 3    glucose blood VI test strips (FREESTYLE LITE STRIPS) strip Test up to 3 times a day Dx Code E10.65 300 Strip 4    Lancets (FREESTYLE LANCETS) misc Test up to 3 times daily. Dx code E10.65 (Patient not taking: No sig reported) 300 Each 4    Blood-Glucose Meter (FREESTYLE LITE METER) monitoring kit To test daily Dx Code 250.00 1 Kit 0     Allergies   Allergen Reactions    Broccoli Nausea and Vomiting       Objective:  Visit Vitals  /77 (BP 1 Location: Right upper arm, BP Patient Position: Sitting, BP Cuff Size: Adult)   Pulse 87   Temp 98 °F (36.7 °C) (Temporal)   Wt 161 lb (73 kg)   SpO2 97%   BMI 26.79 kg/m²     Physical Exam:   Physical Exam  Constitutional:       General: She is not in acute distress. Appearance: Normal appearance. HENT:      Head: Normocephalic and atraumatic.       Mouth/Throat:      Mouth: Mucous membranes are moist.   Eyes:      Extraocular Movements: Extraocular movements intact. Conjunctiva/sclera: Conjunctivae normal.      Pupils: Pupils are equal, round, and reactive to light. Cardiovascular:      Rate and Rhythm: Normal rate and regular rhythm. Pulses: Normal pulses. Dorsalis pedis pulses are 2+ on the right side and 2+ on the left side. Posterior tibial pulses are 2+ on the right side and 2+ on the left side. Heart sounds: Normal heart sounds. Pulmonary:      Effort: Pulmonary effort is normal.      Breath sounds: Normal breath sounds. Musculoskeletal:         General: No swelling or deformity. Cervical back: Normal range of motion and neck supple. Right lower leg: No edema. Left lower leg: No edema. Right foot: Normal range of motion. No deformity, bunion, Charcot foot, foot drop or prominent metatarsal heads. Left foot: Normal range of motion. No deformity, bunion, Charcot foot, foot drop or prominent metatarsal heads. Comments: Creipitus right knee, no other abnormal findings    Feet:      Right foot:      Protective Sensation: 10 sites tested. 10 sites sensed. Skin integrity: Skin integrity normal.      Toenail Condition: Right toenails are normal.      Left foot:      Protective Sensation: 10 sites tested. 10 sites sensed. Skin integrity: Skin integrity normal.      Toenail Condition: Left toenails are normal.   Lymphadenopathy:      Cervical: No cervical adenopathy. Skin:     General: Skin is warm and dry. Capillary Refill: Capillary refill takes less than 2 seconds. Coloration: Skin is not jaundiced or pale. Findings: No erythema or rash. Neurological:      General: No focal deficit present. Mental Status: She is alert and oriented to person, place, and time. Psychiatric:         Mood and Affect: Mood normal.         Behavior: Behavior normal.         Thought Content:  Thought content normal.         Judgment: Judgment normal.        Results for orders placed or performed in visit on 53/53/19   METABOLIC PANEL, BASIC   Result Value Ref Range    Glucose 267 (H) 70 - 99 mg/dL    BUN 12 6 - 20 mg/dL    Creatinine 0.73 0.57 - 1.00 mg/dL    eGFR 113 >59 mL/min/1.73    BUN/Creatinine ratio 16 9 - 23    Sodium 142 134 - 144 mmol/L    Potassium 4.2 3.5 - 5.2 mmol/L    Chloride 105 96 - 106 mmol/L    CO2 21 20 - 29 mmol/L    Calcium 9.0 8.7 - 10.2 mg/dL   AMB POC HEMOGLOBIN A1C   Result Value Ref Range    Hemoglobin A1c (POC) 7.9 %   AMB POC GLUCOSE BLOOD, BY GLUCOSE MONITORING DEVICE   Result Value Ref Range    Glucose  MG/DL       Assessment/Plan:    The pain she has in the right lower extremity is not because of the diabetes, she does not have any signs of symptoms whatsoever neuropathy. I explained her that, do initial work-up as below. Pedal exam within normal limits. She is not under the care of endocrinology for type 1 diabetes Dr. Helio Rodriguez, will ask for notes and lab results      ICD-10-CM ICD-9-CM    1. Chronic pain of right knee  M25.561 719.46 XR KNEE RT MAX 2 VWS    G89.29 338.29 US EXT NONVAS RT COMP      2. Right calf pain  M79.661 729.5 US EXT NONVAS RT COMP      3. Uncontrolled type 1 diabetes mellitus with hyperglycemia (HCC)  E10.65 250.83  DIABETES FOOT EXAM     790.29       4. Diabetic retinopathy of left eye with macular edema associated with type 1 diabetes mellitus, unspecified retinopathy severity (HCC)  E10.311 250.51      362.07      362.01       5. Need for hepatitis C screening test  Z11.59 V73.89 HEPATITIS C AB        Orders Placed This Encounter    XR KNEE RT MAX 2 VWS     Standing Status:   Future     Standing Expiration Date:   3/8/2024     Order Specific Question:   Is Patient Pregnant? Answer:   No    US EXT NONVAS RT COMP     Standing Status:   Future     Standing Expiration Date:   3/8/2024     Order Specific Question:   Is Patient Pregnant?      Answer:   No     Order Specific Question:   Specific Body Part     Answer: posterior aspect of right knee and calf     Order Specific Question:   Reason for Exam     Answer:   chronic (more than 17 years) pain in right calf and posterior aspect of right knee. r/o baker's cyst    HEPATITIS C AB    HM DIABETES FOOT EXAM    Dexcom G6 Transmitter dionisio     Sig: AS DIRECTED EVERY 90 DAYS    Dexcom G6 Sensor dionisio     Sig: AS DIRECTED EVERY 10 DAYS     call if any problems  There are no Patient Instructions on file for this visit. Follow-up and Dispositions    Return in about 6 months (around 8/8/2023).

## 2023-02-08 NOTE — PROGRESS NOTES
Chief Complaint   Patient presents with    Diabetes    Visit Vitals  /77 (BP 1 Location: Right upper arm, BP Patient Position: Sitting, BP Cuff Size: Adult)   Pulse 87   Temp 98 °F (36.7 °C) (Temporal)   Wt 161 lb (73 kg)   SpO2 97%   BMI 26.79 kg/m²    1. Have you been to the ER, urgent care clinic since your last visit? Hospitalized since your last visit? No    2. Have you seen or consulted any other health care providers outside of the [address] [place] [address] since your last visit? Include any pap smears or colon screening.  No

## 2023-02-22 NOTE — TELEPHONE ENCOUNTER
Called office for labs and last office note, office closed. I have faxed a request.to send labs and last office note to Dr. Evin Kamara. [address].  Thank you

## 2023-05-14 LAB — HCV IGG SERPL QL IA: NON REACTIVE

## 2023-07-10 ENCOUNTER — TELEPHONE (OUTPATIENT)
Facility: CLINIC | Age: 33
End: 2023-07-10

## 2023-07-10 NOTE — TELEPHONE ENCOUNTER
Called pt regarding lab results. Mail box full unable to leave message. Mailed note with results>normal. To pt.

## 2023-08-15 ENCOUNTER — OFFICE VISIT (OUTPATIENT)
Facility: CLINIC | Age: 33
End: 2023-08-15
Payer: COMMERCIAL

## 2023-08-15 VITALS
BODY MASS INDEX: 25.09 KG/M2 | TEMPERATURE: 98.1 F | SYSTOLIC BLOOD PRESSURE: 126 MMHG | RESPIRATION RATE: 18 BRPM | WEIGHT: 150.6 LBS | OXYGEN SATURATION: 98 % | DIASTOLIC BLOOD PRESSURE: 87 MMHG | HEIGHT: 65 IN | HEART RATE: 104 BPM

## 2023-08-15 DIAGNOSIS — Z12.4 SCREENING FOR CERVICAL CANCER: Primary | ICD-10-CM

## 2023-08-15 DIAGNOSIS — E10.65 TYPE 1 DIABETES MELLITUS WITH HYPERGLYCEMIA (HCC): ICD-10-CM

## 2023-08-15 DIAGNOSIS — S93.402A SPRAIN OF LEFT ANKLE, UNSPECIFIED LIGAMENT, INITIAL ENCOUNTER: ICD-10-CM

## 2023-08-15 PROCEDURE — 99395 PREV VISIT EST AGE 18-39: CPT | Performed by: INTERNAL MEDICINE

## 2023-08-15 RX ORDER — PROCHLORPERAZINE 25 MG/1
SUPPOSITORY RECTAL
COMMUNITY
Start: 2023-06-20

## 2023-08-15 SDOH — ECONOMIC STABILITY: HOUSING INSECURITY
IN THE LAST 12 MONTHS, WAS THERE A TIME WHEN YOU DID NOT HAVE A STEADY PLACE TO SLEEP OR SLEPT IN A SHELTER (INCLUDING NOW)?: NO

## 2023-08-15 SDOH — ECONOMIC STABILITY: INCOME INSECURITY: HOW HARD IS IT FOR YOU TO PAY FOR THE VERY BASICS LIKE FOOD, HOUSING, MEDICAL CARE, AND HEATING?: NOT HARD AT ALL

## 2023-08-15 SDOH — ECONOMIC STABILITY: FOOD INSECURITY: WITHIN THE PAST 12 MONTHS, YOU WORRIED THAT YOUR FOOD WOULD RUN OUT BEFORE YOU GOT MONEY TO BUY MORE.: NEVER TRUE

## 2023-08-15 SDOH — ECONOMIC STABILITY: FOOD INSECURITY: WITHIN THE PAST 12 MONTHS, THE FOOD YOU BOUGHT JUST DIDN'T LAST AND YOU DIDN'T HAVE MONEY TO GET MORE.: NEVER TRUE

## 2023-08-15 ASSESSMENT — PATIENT HEALTH QUESTIONNAIRE - PHQ9
1. LITTLE INTEREST OR PLEASURE IN DOING THINGS: 0
SUM OF ALL RESPONSES TO PHQ QUESTIONS 1-9: 0
8. MOVING OR SPEAKING SO SLOWLY THAT OTHER PEOPLE COULD HAVE NOTICED. OR THE OPPOSITE, BEING SO FIGETY OR RESTLESS THAT YOU HAVE BEEN MOVING AROUND A LOT MORE THAN USUAL: 0
4. FEELING TIRED OR HAVING LITTLE ENERGY: 0
SUM OF ALL RESPONSES TO PHQ QUESTIONS 1-9: 0
SUM OF ALL RESPONSES TO PHQ QUESTIONS 1-9: 0
9. THOUGHTS THAT YOU WOULD BE BETTER OFF DEAD, OR OF HURTING YOURSELF: 0
5. POOR APPETITE OR OVEREATING: 0
3. TROUBLE FALLING OR STAYING ASLEEP: 0
SUM OF ALL RESPONSES TO PHQ QUESTIONS 1-9: 0
SUM OF ALL RESPONSES TO PHQ9 QUESTIONS 1 & 2: 0
6. FEELING BAD ABOUT YOURSELF - OR THAT YOU ARE A FAILURE OR HAVE LET YOURSELF OR YOUR FAMILY DOWN: 0
10. IF YOU CHECKED OFF ANY PROBLEMS, HOW DIFFICULT HAVE THESE PROBLEMS MADE IT FOR YOU TO DO YOUR WORK, TAKE CARE OF THINGS AT HOME, OR GET ALONG WITH OTHER PEOPLE: 0
7. TROUBLE CONCENTRATING ON THINGS, SUCH AS READING THE NEWSPAPER OR WATCHING TELEVISION: 0
2. FEELING DOWN, DEPRESSED OR HOPELESS: 0

## 2023-08-15 ASSESSMENT — ANXIETY QUESTIONNAIRES
4. TROUBLE RELAXING: 0
6. BECOMING EASILY ANNOYED OR IRRITABLE: 0
7. FEELING AFRAID AS IF SOMETHING AWFUL MIGHT HAPPEN: 0
1. FEELING NERVOUS, ANXIOUS, OR ON EDGE: 0
2. NOT BEING ABLE TO STOP OR CONTROL WORRYING: 0
3. WORRYING TOO MUCH ABOUT DIFFERENT THINGS: 0
IF YOU CHECKED OFF ANY PROBLEMS ON THIS QUESTIONNAIRE, HOW DIFFICULT HAVE THESE PROBLEMS MADE IT FOR YOU TO DO YOUR WORK, TAKE CARE OF THINGS AT HOME, OR GET ALONG WITH OTHER PEOPLE: NOT DIFFICULT AT ALL
GAD7 TOTAL SCORE: 0
5. BEING SO RESTLESS THAT IT IS HARD TO SIT STILL: 0

## 2023-08-15 ASSESSMENT — ENCOUNTER SYMPTOMS
GASTROINTESTINAL NEGATIVE: 1
RESPIRATORY NEGATIVE: 1

## 2023-08-15 NOTE — PROGRESS NOTES
Deana Alston is a 34 y.o. female and presents with Follow-up and Diabetes    She was having pain in her right leg last visit, it resolved. She is seeing endocrinology at [facility], her last A1c 5.9, reviewed in care everywhere. She is doing well for the most part she still has highs in the 400s hypoglycemia but she is able to identify and control it, she has glucagon at home and she has not needed to use it. Overall she is feeling well, about a week ago she felt a pain in her left ankle when moving some furniture at her job, she denies any twisting of the ankle or any trauma but she has intermittently a little bit of pain when she walks and some tenderness. Review of Systems  Review of Systems   Constitutional: Negative. HENT: Negative. Respiratory: Negative. Cardiovascular: Negative. Gastrointestinal: Negative. Endocrine: Negative. Genitourinary: Negative. Musculoskeletal: Negative. Skin: Negative. Neurological: Negative. Psychiatric/Behavioral: Negative. All other systems reviewed and are negative.        Past Medical History:   Diagnosis Date    Diabetic retinal damage of right eye ([address])     Type 1 diabetes mellitus, uncontrolled      Past Surgical History:   Procedure Laterality Date    CYST REMOVAL N/A 02/09/2020    Patient had a cyst removal 02/09/2020    WISDOM TOOTH EXTRACTION       Social History     Socioeconomic History    Marital status: Single     Spouse name: None    Number of children: None    Years of education: None    Highest education level: None   Tobacco Use    Smoking status: Never    Smokeless tobacco: Never   Vaping Use    Vaping Use: Never used   Substance and Sexual Activity    Alcohol use: Yes    Drug use: No     Social Determinants of Health     Financial Resource Strain: Low Risk     Difficulty of Paying Living Expenses: Not hard at all   Food Insecurity: No Food Insecurity    Worried About [place] in the Last Year: Never true    Ran

## 2023-08-18 LAB — HBA1C MFR BLD HPLC: 7.1 %

## 2023-11-16 ENCOUNTER — OFFICE VISIT (OUTPATIENT)
Age: 33
End: 2023-11-16
Payer: COMMERCIAL

## 2023-11-16 VITALS
RESPIRATION RATE: 16 BRPM | BODY MASS INDEX: 29.56 KG/M2 | WEIGHT: 177.44 LBS | DIASTOLIC BLOOD PRESSURE: 81 MMHG | TEMPERATURE: 97.8 F | HEART RATE: 83 BPM | SYSTOLIC BLOOD PRESSURE: 129 MMHG | OXYGEN SATURATION: 100 % | HEIGHT: 65 IN

## 2023-11-16 DIAGNOSIS — Z01.419 WELL FEMALE EXAM WITH ROUTINE GYNECOLOGICAL EXAM: ICD-10-CM

## 2023-11-16 DIAGNOSIS — Z80.3 FAMILY HISTORY OF BREAST CANCER IN MALE: ICD-10-CM

## 2023-11-16 DIAGNOSIS — Z12.4 SCREENING FOR MALIGNANT NEOPLASM OF CERVIX: Primary | ICD-10-CM

## 2023-11-16 PROCEDURE — 99385 PREV VISIT NEW AGE 18-39: CPT | Performed by: OBSTETRICS & GYNECOLOGY

## 2023-11-19 LAB
., LABCORP: NORMAL
C TRACH RRNA CVX QL NAA+PROBE: NEGATIVE
CYTOLOGIST CVX/VAG CYTO: NORMAL
CYTOLOGY CVX/VAG DOC CYTO: NORMAL
CYTOLOGY CVX/VAG DOC THIN PREP: NORMAL
DX ICD CODE: NORMAL
Lab: NORMAL
N GONORRHOEA RRNA CVX QL NAA+PROBE: NEGATIVE
OTHER STN SPEC: NORMAL
STAT OF ADQ CVX/VAG CYTO-IMP: NORMAL
T VAGINALIS RRNA SPEC QL NAA+PROBE: NEGATIVE

## 2023-12-13 ENCOUNTER — APPOINTMENT (OUTPATIENT)
Facility: HOSPITAL | Age: 33
End: 2023-12-13
Payer: COMMERCIAL

## 2023-12-13 ENCOUNTER — HOSPITAL ENCOUNTER (EMERGENCY)
Facility: HOSPITAL | Age: 33
Discharge: HOME | End: 2023-12-13
Attending: STUDENT IN AN ORGANIZED HEALTH CARE EDUCATION/TRAINING PROGRAM
Payer: COMMERCIAL

## 2023-12-13 VITALS
HEIGHT: 65 IN | BODY MASS INDEX: 26.66 KG/M2 | OXYGEN SATURATION: 100 % | WEIGHT: 160 LBS | DIASTOLIC BLOOD PRESSURE: 81 MMHG | HEART RATE: 88 BPM | RESPIRATION RATE: 19 BRPM | TEMPERATURE: 98.2 F | SYSTOLIC BLOOD PRESSURE: 126 MMHG

## 2023-12-13 DIAGNOSIS — R07.89 ATYPICAL CHEST PAIN: Primary | ICD-10-CM

## 2023-12-13 DIAGNOSIS — R00.0 SINUS TACHYCARDIA: ICD-10-CM

## 2023-12-13 LAB
ALBUMIN SERPL-MCNC: 4.2 G/DL (ref 3.5–5)
ALBUMIN/GLOB SERPL: 1.2 (ref 1.1–2.2)
ALP SERPL-CCNC: 94 U/L (ref 45–117)
ALT SERPL-CCNC: 30 U/L (ref 12–78)
ANION GAP BLD CALC-SCNC: 16
ANION GAP SERPL CALC-SCNC: 10 MMOL/L (ref 5–15)
AST SERPL W P-5'-P-CCNC: ABNORMAL U/L (ref 15–37)
BASOPHILS # BLD: 0.1 K/UL (ref 0–0.1)
BASOPHILS NFR BLD: 1 % (ref 0–1)
BILIRUB SERPL-MCNC: 0.6 MG/DL (ref 0.2–1)
BNP SERPL-MCNC: 31 PG/ML
BUN SERPL-MCNC: 23 MG/DL (ref 6–20)
BUN/CREAT SERPL: 24 (ref 12–20)
CA-I BLD-MCNC: 1.19 MMOL/L (ref 1.12–1.32)
CA-I BLD-MCNC: 9.3 MG/DL (ref 8.5–10.1)
CHLORIDE BLD-SCNC: 101 MMOL/L (ref 98–107)
CHLORIDE SERPL-SCNC: 102 MMOL/L (ref 97–108)
CO2 BLD-SCNC: 24 MMOL/L
CO2 SERPL-SCNC: 24 MMOL/L (ref 21–32)
CREAT SERPL-MCNC: 0.95 MG/DL (ref 0.55–1.02)
CREAT UR-MCNC: 0.44 MG/DL (ref 0.6–1.3)
DIFFERENTIAL METHOD BLD: NORMAL
EKG ATRIAL RATE: 127 BPM
EKG DIAGNOSIS: NORMAL
EKG P AXIS: 27 DEGREES
EKG P-R INTERVAL: 124 MS
EKG Q-T INTERVAL: 292 MS
EKG QRS DURATION: 74 MS
EKG QTC CALCULATION (BAZETT): 424 MS
EKG R AXIS: 53 DEGREES
EKG T AXIS: 50 DEGREES
EKG VENTRICULAR RATE: 127 BPM
EOSINOPHIL # BLD: 0.1 K/UL (ref 0–0.4)
EOSINOPHIL NFR BLD: 1 % (ref 0–7)
ERYTHROCYTE [DISTWIDTH] IN BLOOD BY AUTOMATED COUNT: 13.9 % (ref 11.5–14.5)
FLUAV AG NPH QL IA: NEGATIVE
FLUBV AG NOSE QL IA: NEGATIVE
GLOBULIN SER CALC-MCNC: 3.6 G/DL (ref 2–4)
GLUCOSE BLD STRIP.AUTO-MCNC: 210 MG/DL (ref 65–100)
GLUCOSE BLD STRIP.AUTO-MCNC: 225 MG/DL (ref 65–100)
GLUCOSE SERPL-MCNC: 195 MG/DL (ref 65–100)
HCT VFR BLD AUTO: 37.9 % (ref 35–47)
HGB BLD-MCNC: 12.7 G/DL (ref 11.5–16)
IMM GRANULOCYTES # BLD AUTO: 0 K/UL (ref 0–0.04)
IMM GRANULOCYTES NFR BLD AUTO: 0 % (ref 0–0.5)
LACTATE BLD-SCNC: 1.33 MMOL/L (ref 0.4–2)
LYMPHOCYTES # BLD: 1.6 K/UL (ref 0.8–3.5)
LYMPHOCYTES NFR BLD: 25 % (ref 12–49)
MAGNESIUM SERPL-MCNC: NORMAL MG/DL (ref 1.6–2.4)
MCH RBC QN AUTO: 27.6 PG (ref 26–34)
MCHC RBC AUTO-ENTMCNC: 33.5 G/DL (ref 30–36.5)
MCV RBC AUTO: 82.4 FL (ref 80–99)
MONOCYTES # BLD: 0.7 K/UL (ref 0–1)
MONOCYTES NFR BLD: 11 % (ref 5–13)
NEUTS SEG # BLD: 4.1 K/UL (ref 1.8–8)
NEUTS SEG NFR BLD: 62 % (ref 32–75)
NRBC # BLD: 0 K/UL (ref 0–0.01)
NRBC BLD-RTO: 0 PER 100 WBC
PERFORMED BY:: ABNORMAL
PERFORMED BY:: ABNORMAL
PLATELET # BLD AUTO: 384 K/UL (ref 150–400)
PMV BLD AUTO: 9.9 FL (ref 8.9–12.9)
POTASSIUM BLD-SCNC: 3.5 MMOL/L (ref 3.5–5.5)
POTASSIUM SERPL-SCNC: ABNORMAL MMOL/L (ref 3.5–5.1)
PROT SERPL-MCNC: 7.8 G/DL (ref 6.4–8.2)
RBC # BLD AUTO: 4.6 M/UL (ref 3.8–5.2)
SARS-COV-2 RDRP RESP QL NAA+PROBE: NOT DETECTED
SODIUM BLD-SCNC: 140 MMOL/L (ref 136–145)
SODIUM SERPL-SCNC: 136 MMOL/L (ref 136–145)
SPECIMEN SITE: ABNORMAL
TROPONIN I SERPL HS-MCNC: 7 NG/L (ref 0–51)
TROPONIN I SERPL HS-MCNC: 9 NG/L (ref 0–51)
TSH SERPL DL<=0.05 MIU/L-ACNC: 4.52 UIU/ML (ref 0.36–3.74)
WBC # BLD AUTO: 6.6 K/UL (ref 3.6–11)

## 2023-12-13 PROCEDURE — 6360000004 HC RX CONTRAST MEDICATION: Performed by: STUDENT IN AN ORGANIZED HEALTH CARE EDUCATION/TRAINING PROGRAM

## 2023-12-13 PROCEDURE — 71275 CT ANGIOGRAPHY CHEST: CPT

## 2023-12-13 PROCEDURE — 80047 BASIC METABLC PNL IONIZED CA: CPT

## 2023-12-13 PROCEDURE — 87635 SARS-COV-2 COVID-19 AMP PRB: CPT

## 2023-12-13 PROCEDURE — 99285 EMERGENCY DEPT VISIT HI MDM: CPT

## 2023-12-13 PROCEDURE — 84484 ASSAY OF TROPONIN QUANT: CPT

## 2023-12-13 PROCEDURE — 84443 ASSAY THYROID STIM HORMONE: CPT

## 2023-12-13 PROCEDURE — 83735 ASSAY OF MAGNESIUM: CPT

## 2023-12-13 PROCEDURE — 71045 X-RAY EXAM CHEST 1 VIEW: CPT

## 2023-12-13 PROCEDURE — 93005 ELECTROCARDIOGRAM TRACING: CPT | Performed by: STUDENT IN AN ORGANIZED HEALTH CARE EDUCATION/TRAINING PROGRAM

## 2023-12-13 PROCEDURE — 82962 GLUCOSE BLOOD TEST: CPT

## 2023-12-13 PROCEDURE — 83605 ASSAY OF LACTIC ACID: CPT

## 2023-12-13 PROCEDURE — 80053 COMPREHEN METABOLIC PANEL: CPT

## 2023-12-13 PROCEDURE — 83880 ASSAY OF NATRIURETIC PEPTIDE: CPT

## 2023-12-13 PROCEDURE — 2580000003 HC RX 258: Performed by: STUDENT IN AN ORGANIZED HEALTH CARE EDUCATION/TRAINING PROGRAM

## 2023-12-13 PROCEDURE — 85025 COMPLETE CBC W/AUTO DIFF WBC: CPT

## 2023-12-13 PROCEDURE — 87804 INFLUENZA ASSAY W/OPTIC: CPT

## 2023-12-13 RX ORDER — 0.9 % SODIUM CHLORIDE 0.9 %
1000 INTRAVENOUS SOLUTION INTRAVENOUS ONCE
Status: COMPLETED | OUTPATIENT
Start: 2023-12-13 | End: 2023-12-13

## 2023-12-13 RX ADMIN — SODIUM CHLORIDE 1000 ML: 9 INJECTION, SOLUTION INTRAVENOUS at 07:01

## 2023-12-13 RX ADMIN — IOPAMIDOL 100 ML: 755 INJECTION, SOLUTION INTRAVENOUS at 07:41

## 2023-12-13 ASSESSMENT — PAIN - FUNCTIONAL ASSESSMENT: PAIN_FUNCTIONAL_ASSESSMENT: 0-10

## 2023-12-13 ASSESSMENT — LIFESTYLE VARIABLES
HOW MANY STANDARD DRINKS CONTAINING ALCOHOL DO YOU HAVE ON A TYPICAL DAY: PATIENT DOES NOT DRINK
HOW OFTEN DO YOU HAVE A DRINK CONTAINING ALCOHOL: NEVER

## 2023-12-13 ASSESSMENT — PAIN SCALES - GENERAL: PAINLEVEL_OUTOF10: 4

## 2023-12-13 ASSESSMENT — PAIN DESCRIPTION - LOCATION: LOCATION: CHEST

## 2023-12-13 ASSESSMENT — HEART SCORE: ECG: 0

## 2023-12-13 NOTE — ED NOTES
Pt and family understanding of dc instructions and followup care, cards and ent provider names/ office numbers provided.   Pt alert oriented and ambulatory at discharge     Magdalena Camilo RN  12/13/23 2125

## 2023-12-13 NOTE — ED NOTES
Attempted iv access x 2 sticks. Unsuccessful. Was able to obtain labs. Dr Carlo Simpson at bedside to attempt [address] guided IV access.       Mimi Neumann RN  12/13/23 3239

## 2023-12-13 NOTE — ED NOTES
Dr Lydia Angelucci at bedside updating pt on results and plan of care     Renny Ruvalcaba RN  12/13/23 0605

## 2023-12-13 NOTE — ED NOTES
Rounded on pt, pt resting on ER stretcher, call bell in reach, fluids still infusing, pt alert and oriented. On cardaic bp and pulse ox monitoring.       Grace Castañeda RN  12/13/23 5787

## 2023-12-13 NOTE — ED PROVIDER NOTES
Range    Influenza A Ag Negative Negative      Influenza B Ag Negative Negative     POCT Glucose    Collection Time: 12/13/23  6:47 AM   Result Value Ref Range    POC Glucose 210 (H) 65 - 100 mg/dL    Performed by: Roxann Laguerre    POC CHEMISTRY (NA,K,ICA,GLU,CALC HCT/HGB,LACTATE,CREA,CL)    Collection Time: 12/13/23  6:54 AM   Result Value Ref Range    POC Sodium 140 136 - 145 mmol/L    POC Potassium 3.5 3.5 - 5.5 mmol/L    POC Ionized Calcium 1.19 1.12 - 1.32 mmol/L    POC Chloride 101 98 - 107 MMOL/L    POC Creatinine 0.44 (L) 0.6 - 1.3 MG/DL    Anion Gap, POC 16      POC Glucose 225 (H) 65 - 100 mg/dL    eGFR, POC >60 >60 ml/min/1.73m2    POC TCO2 24 MMOL/L    POC Lactic Acid 1.33 0.40 - 2.00 mmol/L    Source Venous      Performed by: Mary Ruiz      Radiologic Studies:  CTA CHEST W WO CONTRAST   Final Result   No evidence of pulmonary embolism. XR CHEST PORTABLE   Final Result      No acute process on portable chest.           Medications ordered:  Medications   sodium chloride 0.9 % bolus 1,000 mL (1,000 mLs IntraVENous New Bag 12/13/23 0701)   iopamidol (ISOVUE-370) 76 % injection 100 mL (100 mLs IntraVENous Given 12/13/23 0741)       Documentation Comments   - I am the first and primary provider for this patient and am the primary provider of record. - Initial assessment performed. The patients presenting problems have been discussed, and the staff are in agreement with the care plan formulated and outlined with them. I have encouraged them to ask questions as they arise throughout their visit. - Available medical records, nursing notes, old EKGs, and EMS run sheets (if patient was EMS transported) were reviewed    Please note that this dictation was completed with [employer], the [employer] voice recognition software. Quite often unanticipated grammatical, syntax, homophones, and other interpretive errors are inadvertently transcribed by the computer software. Please disregard these errors.

## 2023-12-13 NOTE — DISCHARGE INSTRUCTIONS
been provided, please have it filled as soon as possible to prevent a delay in treatment. Read the entire medication instruction sheet provided to you by the pharmacy. If you have any questions or reservations about taking the medication due to side effects or interactions with other medications, please call your primary care physician or contact the ER to speak with the charge nurse. Make an appointment with your family doctor or the physician you were referred to for follow-up of this visit as instructed on your discharge paperwork, as this is a mandatory follow-up. Return to the ER if you are unable to be seen or if you are unable to be seen in a timely manner. If you have any problem arranging the follow-up visit, contact the Emergency Department immediately.

## 2024-01-04 LAB — LEFT VENTRICULAR EJECTION FRACTION, EXTERNAL: NORMAL

## 2025-05-12 ENCOUNTER — HOSPITAL ENCOUNTER (EMERGENCY)
Facility: HOSPITAL | Age: 35
Discharge: HOME | End: 2025-05-12
Attending: STUDENT IN AN ORGANIZED HEALTH CARE EDUCATION/TRAINING PROGRAM
Payer: COMMERCIAL

## 2025-05-12 ENCOUNTER — APPOINTMENT (OUTPATIENT)
Facility: HOSPITAL | Age: 35
End: 2025-05-12
Payer: COMMERCIAL

## 2025-05-12 VITALS
RESPIRATION RATE: 16 BRPM | TEMPERATURE: 98.3 F | WEIGHT: 158 LBS | HEIGHT: 65 IN | BODY MASS INDEX: 26.33 KG/M2 | DIASTOLIC BLOOD PRESSURE: 88 MMHG | HEART RATE: 80 BPM | SYSTOLIC BLOOD PRESSURE: 131 MMHG | OXYGEN SATURATION: 100 %

## 2025-05-12 DIAGNOSIS — R42 DIZZINESS: Primary | ICD-10-CM

## 2025-05-12 LAB
ALBUMIN SERPL-MCNC: 4.4 G/DL (ref 3.5–5)
ALBUMIN/GLOB SERPL: 1.2 (ref 1.1–2.2)
ALP SERPL-CCNC: 108 U/L (ref 45–117)
ALT SERPL-CCNC: 23 U/L (ref 12–78)
ANION GAP SERPL CALC-SCNC: 9 MMOL/L (ref 2–12)
AST SERPL W P-5'-P-CCNC: 13 U/L (ref 15–37)
BASOPHILS # BLD: 0.06 K/UL (ref 0–0.1)
BASOPHILS NFR BLD: 0.9 % (ref 0–1)
BILIRUB SERPL-MCNC: 0.4 MG/DL (ref 0.2–1)
BUN SERPL-MCNC: 28 MG/DL (ref 6–20)
BUN/CREAT SERPL: 29 (ref 12–20)
CA-I BLD-MCNC: 10.3 MG/DL (ref 8.5–10.1)
CHLORIDE SERPL-SCNC: 100 MMOL/L (ref 97–108)
CO2 SERPL-SCNC: 24 MMOL/L (ref 21–32)
CREAT SERPL-MCNC: 0.95 MG/DL (ref 0.55–1.02)
DIFFERENTIAL METHOD BLD: ABNORMAL
EKG ATRIAL RATE: 85 BPM
EKG DIAGNOSIS: NORMAL
EKG P AXIS: 23 DEGREES
EKG P-R INTERVAL: 112 MS
EKG Q-T INTERVAL: 342 MS
EKG QRS DURATION: 80 MS
EKG QTC CALCULATION (BAZETT): 406 MS
EKG R AXIS: 30 DEGREES
EKG T AXIS: 24 DEGREES
EKG VENTRICULAR RATE: 85 BPM
EOSINOPHIL # BLD: 0.04 K/UL (ref 0–0.4)
EOSINOPHIL NFR BLD: 0.6 % (ref 0–7)
ERYTHROCYTE [DISTWIDTH] IN BLOOD BY AUTOMATED COUNT: 14.3 % (ref 11.5–14.5)
GLOBULIN SER CALC-MCNC: 3.6 G/DL (ref 2–4)
GLUCOSE SERPL-MCNC: 343 MG/DL (ref 65–100)
HCT VFR BLD AUTO: 36.6 % (ref 35–47)
HGB BLD-MCNC: 11.9 G/DL (ref 11.5–16)
IMM GRANULOCYTES # BLD AUTO: 0.02 K/UL (ref 0–0.04)
IMM GRANULOCYTES NFR BLD AUTO: 0.3 % (ref 0–0.5)
LYMPHOCYTES # BLD: 2.2 K/UL (ref 0.8–3.5)
LYMPHOCYTES NFR BLD: 31.7 % (ref 12–49)
MAGNESIUM SERPL-MCNC: 2.1 MG/DL (ref 1.6–2.4)
MCH RBC QN AUTO: 26.3 PG (ref 26–34)
MCHC RBC AUTO-ENTMCNC: 32.5 G/DL (ref 30–36.5)
MCV RBC AUTO: 80.8 FL (ref 80–99)
MONOCYTES # BLD: 0.62 K/UL (ref 0–1)
MONOCYTES NFR BLD: 8.9 % (ref 5–13)
NEUTS SEG # BLD: 4.01 K/UL (ref 1.8–8)
NEUTS SEG NFR BLD: 57.6 % (ref 32–75)
NRBC # BLD: 0 K/UL (ref 0–0.01)
NRBC BLD-RTO: 0 PER 100 WBC
PLATELET # BLD AUTO: 418 K/UL (ref 150–400)
PMV BLD AUTO: 9.3 FL (ref 8.9–12.9)
POTASSIUM SERPL-SCNC: 4.7 MMOL/L (ref 3.5–5.1)
PROT SERPL-MCNC: 8 G/DL (ref 6.4–8.2)
RBC # BLD AUTO: 4.53 M/UL (ref 3.8–5.2)
SODIUM SERPL-SCNC: 133 MMOL/L (ref 136–145)
TROPONIN I SERPL HS-MCNC: <4 NG/L (ref 0–51)
WBC # BLD AUTO: 7 K/UL (ref 3.6–11)

## 2025-05-12 PROCEDURE — 93005 ELECTROCARDIOGRAM TRACING: CPT | Performed by: STUDENT IN AN ORGANIZED HEALTH CARE EDUCATION/TRAINING PROGRAM

## 2025-05-12 PROCEDURE — 6360000004 HC RX CONTRAST MEDICATION: Performed by: STUDENT IN AN ORGANIZED HEALTH CARE EDUCATION/TRAINING PROGRAM

## 2025-05-12 PROCEDURE — 99285 EMERGENCY DEPT VISIT HI MDM: CPT

## 2025-05-12 PROCEDURE — 85025 COMPLETE CBC W/AUTO DIFF WBC: CPT

## 2025-05-12 PROCEDURE — 6370000000 HC RX 637 (ALT 250 FOR IP): Performed by: STUDENT IN AN ORGANIZED HEALTH CARE EDUCATION/TRAINING PROGRAM

## 2025-05-12 PROCEDURE — 84484 ASSAY OF TROPONIN QUANT: CPT

## 2025-05-12 PROCEDURE — 70450 CT HEAD/BRAIN W/O DYE: CPT

## 2025-05-12 PROCEDURE — 2580000003 HC RX 258: Performed by: STUDENT IN AN ORGANIZED HEALTH CARE EDUCATION/TRAINING PROGRAM

## 2025-05-12 PROCEDURE — 70498 CT ANGIOGRAPHY NECK: CPT

## 2025-05-12 PROCEDURE — 70551 MRI BRAIN STEM W/O DYE: CPT

## 2025-05-12 PROCEDURE — 80053 COMPREHEN METABOLIC PANEL: CPT

## 2025-05-12 PROCEDURE — 83735 ASSAY OF MAGNESIUM: CPT

## 2025-05-12 PROCEDURE — 36415 COLL VENOUS BLD VENIPUNCTURE: CPT

## 2025-05-12 RX ORDER — MECLIZINE HYDROCHLORIDE 25 MG/1
25 TABLET ORAL ONCE
Status: COMPLETED | OUTPATIENT
Start: 2025-05-12 | End: 2025-05-12

## 2025-05-12 RX ORDER — 0.9 % SODIUM CHLORIDE 0.9 %
1000 INTRAVENOUS SOLUTION INTRAVENOUS ONCE
Status: COMPLETED | OUTPATIENT
Start: 2025-05-12 | End: 2025-05-12

## 2025-05-12 RX ORDER — 0.9 % SODIUM CHLORIDE 0.9 %
500 INTRAVENOUS SOLUTION INTRAVENOUS ONCE
Status: COMPLETED | OUTPATIENT
Start: 2025-05-12 | End: 2025-05-12

## 2025-05-12 RX ORDER — IOPAMIDOL 755 MG/ML
100 INJECTION, SOLUTION INTRAVASCULAR
Status: COMPLETED | OUTPATIENT
Start: 2025-05-12 | End: 2025-05-12

## 2025-05-12 RX ADMIN — MECLIZINE HYDROCHLORIDE 25 MG: 25 TABLET ORAL at 09:39

## 2025-05-12 RX ADMIN — SODIUM CHLORIDE 1000 ML: 0.9 INJECTION, SOLUTION INTRAVENOUS at 09:35

## 2025-05-12 RX ADMIN — IOPAMIDOL 100 ML: 755 INJECTION, SOLUTION INTRAVENOUS at 11:35

## 2025-05-12 RX ADMIN — MECLIZINE HYDROCHLORIDE 25 MG: 25 TABLET ORAL at 15:02

## 2025-05-12 RX ADMIN — SODIUM CHLORIDE 500 ML: 0.9 INJECTION, SOLUTION INTRAVENOUS at 12:14

## 2025-05-12 ASSESSMENT — PAIN SCALES - GENERAL: PAINLEVEL_OUTOF10: 3

## 2025-05-12 ASSESSMENT — PAIN - FUNCTIONAL ASSESSMENT: PAIN_FUNCTIONAL_ASSESSMENT: 0-10

## 2025-05-12 NOTE — ED TRIAGE NOTES
C/o dizziness that started this morning while at work    Pt had injections in her eyes last Wednesday for diabetic retinopathy.     Hx type 1 diabetes.

## 2025-05-12 NOTE — DISCHARGE INSTRUCTIONS
Thank you for choosing our Emergency Department for your care.  It is our privilege to care for you in your time of need.  In the next several days, you may receive a survey via email or mailed to your home about your experience with our team.  We would greatly appreciate you taking a few minutes to complete the survey, as we use this information to learn what we have done well and what we could be doing better. Thank you for trusting us with your care!    Below you will find a list of your tests from today's visit.   Labs and Radiology Studies  Recent Results (from the past 12 hours)   CBC with Auto Differential    Collection Time: 05/12/25  8:53 AM   Result Value Ref Range    WBC 7.0 3.6 - 11.0 K/uL    RBC 4.53 3.80 - 5.20 M/uL    Hemoglobin 11.9 11.5 - 16.0 g/dL    Hematocrit 36.6 35.0 - 47.0 %    MCV 80.8 80.0 - 99.0 FL    MCH 26.3 26.0 - 34.0 PG    MCHC 32.5 30.0 - 36.5 g/dL    RDW 14.3 11.5 - 14.5 %    Platelets 418 (H) 150 - 400 K/uL    MPV 9.3 8.9 - 12.9 FL    Nucleated RBCs 0.0 0.0  WBC    nRBC 0.00 0.00 - 0.01 K/uL    Neutrophils % 57.6 32.0 - 75.0 %    Lymphocytes % 31.7 12.0 - 49.0 %    Monocytes % 8.9 5.0 - 13.0 %    Eosinophils % 0.6 0.0 - 7.0 %    Basophils % 0.9 0.0 - 1.0 %    Immature Granulocytes % 0.3 0 - 0.5 %    Neutrophils Absolute 4.01 1.80 - 8.00 K/UL    Lymphocytes Absolute 2.20 0.80 - 3.50 K/UL    Monocytes Absolute 0.62 0.00 - 1.00 K/UL    Eosinophils Absolute 0.04 0.00 - 0.40 K/UL    Basophils Absolute 0.06 0.00 - 0.10 K/UL    Immature Granulocytes Absolute 0.02 0.00 - 0.04 K/UL    Differential Type AUTOMATED     Comprehensive Metabolic Panel    Collection Time: 05/12/25  8:53 AM   Result Value Ref Range    Sodium 133 (L) 136 - 145 mmol/L    Potassium 4.7 3.5 - 5.1 mmol/L    Chloride 100 97 - 108 mmol/L    CO2 24 21 - 32 mmol/L    Anion Gap 9 2 - 12 mmol/L    Glucose 343 (H) 65 - 100 mg/dL    BUN 28 (H) 6 - 20 mg/dL    Creatinine 0.95 0.55 - 1.02 mg/dL    BUN/Creatinine Ratio

## 2025-05-12 NOTE — ED PROVIDER NOTES
Diabetes Maternal Grandfather     Hypertension Paternal Grandfather     Cancer Paternal Grandfather        Social History:  Social History     Tobacco Use    Smoking status: Never    Smokeless tobacco: Never   Vaping Use    Vaping status: Never Used   Substance Use Topics    Alcohol use: Not Currently    Drug use: No       Allergies:  Allergies   Allergen Reactions    Zinc Anaphylaxis    Brassica Oleracea Nausea And Vomiting       PCP: Yanet Batista MD    Current Medications:   No current facility-administered medications for this encounter.     Current Outpatient Medications   Medication Sig Dispense Refill    Continuous Blood Gluc Sensor (DEXCOM G6 SENSOR) MISC APPLY 1 SENSOR AS DIRECTED EVERY 10 DAYS      Continuous Blood Gluc Transmit (DEXCOM G6 TRANSMITTER) MISC AS DIRECTED      insulin lispro (HUMALOG) 100 UNIT/ML SOLN injection vial USE IN INSULIN PUMP AS DIRECTED. MAX DAILY DOSE IS 80 UNITS, 90 days         Social Determinants of Health:   Social Drivers of Health     Tobacco Use: Low Risk  (5/12/2025)    Patient History     Smoking Tobacco Use: Never     Smokeless Tobacco Use: Never     Passive Exposure: Not on file   Alcohol Use: Not At Risk (5/12/2025)    AUDIT-C     Frequency of Alcohol Consumption: Never     Average Number of Drinks: Patient does not drink     Frequency of Binge Drinking: Never   Financial Resource Strain: Low Risk  (8/15/2023)    Overall Financial Resource Strain (CARDIA)     Difficulty of Paying Living Expenses: Not hard at all   Food Insecurity: Not on file (8/15/2023)   Transportation Needs: Unknown (8/15/2023)    PRAPARE - Transportation     Lack of Transportation (Medical): Not on file     Lack of Transportation (Non-Medical): No   Physical Activity: Unknown (7/21/2020)    Received from [facility]  (prior to 6/17/2023), [facility]  (prior to 6/17/2023)    Exercise Vital Sign     Days of Exercise per Week: 1 day     Minutes of Exercise per

## 2025-05-13 ENCOUNTER — OFFICE VISIT (OUTPATIENT)
Facility: CLINIC | Age: 35
End: 2025-05-13
Payer: COMMERCIAL

## 2025-05-13 VITALS
BODY MASS INDEX: 25.49 KG/M2 | RESPIRATION RATE: 16 BRPM | SYSTOLIC BLOOD PRESSURE: 116 MMHG | HEIGHT: 65 IN | OXYGEN SATURATION: 98 % | WEIGHT: 153 LBS | TEMPERATURE: 98.2 F | HEART RATE: 114 BPM | DIASTOLIC BLOOD PRESSURE: 77 MMHG

## 2025-05-13 DIAGNOSIS — R42 VERTIGO: Primary | ICD-10-CM

## 2025-05-13 PROCEDURE — 99214 OFFICE O/P EST MOD 30 MIN: CPT | Performed by: NURSE PRACTITIONER

## 2025-05-13 RX ORDER — MECLIZINE HCL 12.5 MG 12.5 MG/1
12.5 TABLET ORAL 3 TIMES DAILY PRN
Qty: 30 TABLET | Refills: 0 | Status: SHIPPED | OUTPATIENT
Start: 2025-05-13 | End: 2025-05-23

## 2025-05-13 SDOH — ECONOMIC STABILITY: FOOD INSECURITY: WITHIN THE PAST 12 MONTHS, YOU WORRIED THAT YOUR FOOD WOULD RUN OUT BEFORE YOU GOT MONEY TO BUY MORE.: NEVER TRUE

## 2025-05-13 SDOH — ECONOMIC STABILITY: FOOD INSECURITY: WITHIN THE PAST 12 MONTHS, THE FOOD YOU BOUGHT JUST DIDN'T LAST AND YOU DIDN'T HAVE MONEY TO GET MORE.: NEVER TRUE

## 2025-05-13 ASSESSMENT — PATIENT HEALTH QUESTIONNAIRE - PHQ9
1. LITTLE INTEREST OR PLEASURE IN DOING THINGS: NOT AT ALL
SUM OF ALL RESPONSES TO PHQ QUESTIONS 1-9: 0
2. FEELING DOWN, DEPRESSED OR HOPELESS: NOT AT ALL
SUM OF ALL RESPONSES TO PHQ QUESTIONS 1-9: 0

## 2025-05-13 NOTE — ASSESSMENT & PLAN NOTE
Chronic, worsening (exacerbation), continue current plan pending work up below and medication adherence emphasized    Orders:    Kristel Maldonado MD, Otolaryngology, [facility] Swenson [facility]    meclizine (ANTIVERT) 12.5 MG tablet; Take 1 tablet by mouth 3 times daily as needed for Dizziness

## 2025-05-13 NOTE — PROGRESS NOTES
Chief Complaint   Patient presents with    Dizziness     Seen in ED 05/12/2025         /77 (BP Site: Left Upper Arm, Patient Position: Sitting)   Pulse (!) 114   Temp 98.2 °F (36.8 °C) (Oral)   Resp 16   Ht 1.651 m (5' 5\")   Wt 69.4 kg (153 lb)   SpO2 98%   BMI 25.46 kg/m²           Have you been to the ER, urgent care clinic since your last visit?  Hospitalized since your last visit?   YES - When: approximately 1 days ago.  Where and Why: dizziness.    Have you seen or consulted any other health care providers outside our system since your last visit?   NO      “Have you had a diabetic eye exam?”    NO     Date of last diabetic eye exam: 1/23/2023            
normal.          Assessment & Plan  Assessment & Plan  Vertigo   Chronic, worsening (exacerbation), continue current plan pending work up below and medication adherence emphasized    Orders:    [facility] Kristel Humphrey MD, Otolaryngology, [facility] Mor [facility]roncoso [facility]    meclizine (ANTIVERT) 12.5 MG tablet; Take 1 tablet by mouth 3 times daily as needed for Dizziness        Aspects of this note have been generated using voice recognition software. Despite editing, there may be some syntax errors.      SANDEE De Jesus - NP

## 2025-07-10 ENCOUNTER — OFFICE VISIT (OUTPATIENT)
Age: 35
End: 2025-07-10
Payer: COMMERCIAL

## 2025-07-10 VITALS
DIASTOLIC BLOOD PRESSURE: 76 MMHG | RESPIRATION RATE: 16 BRPM | SYSTOLIC BLOOD PRESSURE: 113 MMHG | WEIGHT: 157.5 LBS | BODY MASS INDEX: 26.24 KG/M2 | OXYGEN SATURATION: 98 % | TEMPERATURE: 98.1 F | HEART RATE: 79 BPM | HEIGHT: 65 IN

## 2025-07-10 DIAGNOSIS — R42 VERTIGO: Primary | ICD-10-CM

## 2025-07-10 PROCEDURE — 99203 OFFICE O/P NEW LOW 30 MIN: CPT | Performed by: SURGERY

## 2025-07-10 ASSESSMENT — PATIENT HEALTH QUESTIONNAIRE - PHQ9
2. FEELING DOWN, DEPRESSED OR HOPELESS: NOT AT ALL
SUM OF ALL RESPONSES TO PHQ QUESTIONS 1-9: 0
1. LITTLE INTEREST OR PLEASURE IN DOING THINGS: NOT AT ALL
SUM OF ALL RESPONSES TO PHQ QUESTIONS 1-9: 0

## 2025-07-10 NOTE — PROGRESS NOTES
Identified pt with two pt identifiers (name and ). Reviewed chart in preparation for visit and have obtained necessary documentation.    Olimpia Eastman is a 35 y.o. female  Chief Complaint   Patient presents with    New Patient      hypoplastic left ICA     /76 (BP Site: Right Upper Arm, Patient Position: Sitting, BP Cuff Size: Large Adult)   Pulse 79   Temp 98.1 °F (36.7 °C) (Oral)   Resp 16   Ht 1.651 m (5' 5\")   Wt 71.4 kg (157 lb 8 oz)   SpO2 98%   BMI 26.21 kg/m²

## 2025-07-17 NOTE — PROGRESS NOTES
Vascular History and Physical    Patient: Olimpia Eastman  MRN: [id number]    YOB: 1990  Age: 35 y.o.  Sex: female     Chief Complaint:  Chief Complaint   Patient presents with    New Patient      hypoplastic left ICA       History of Present Illness: Olimpia Eastman is a 35 y.o. very pleasant woman is here today for vascular consultation.  Patient is accompanied by her mother.  Patient was complaining of a dizzy spell since prior COVID outbreak.  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  Dizzy spell has been fairly new.  Patient denies any chest pain shortness of breath.  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        Social History:  Social Connections: Not on file       Past Medical History:  Past Medical History:   Diagnosis Date    Diabetic retinal damage of right eye (HCC)     Type 1 diabetes mellitus, uncontrolled        Surgical History:  Past Surgical History:   Procedure Laterality Date    CYST REMOVAL N/A 02/09/2020    Patient had a cyst removal 02/09/2020    WISDOM TOOTH EXTRACTION         Allergies:  Allergies   Allergen Reactions    Zinc Anaphylaxis    Brassica Oleracea Nausea And Vomiting       Current Meds:  Current Outpatient Medications   Medication Sig Dispense Refill    Continuous Blood Gluc Sensor (DEXCOM G6 SENSOR) MISC APPLY 1 SENSOR AS DIRECTED EVERY 10 DAYS      Continuous Blood Gluc Transmit (DEXCOM G6 TRANSMITTER) MISC AS DIRECTED      insulin lispro (HUMALOG) 100 UNIT/ML SOLN injection vial USE IN INSULIN PUMP AS DIRECTED. MAX DAILY DOSE IS 80 UNITS, 90 days       No current facility-administered medications for this visit.       Review of Systems:  I reviewed the rest of organ systems personally and they are negative.  Pertinent findings discussed in HPI.    Physical Examination    /76 (BP Site: Right Upper Arm, Patient Position: Sitting, BP Cuff Size: Large